# Patient Record
Sex: FEMALE | Race: WHITE | Employment: FULL TIME | URBAN - METROPOLITAN AREA
[De-identification: names, ages, dates, MRNs, and addresses within clinical notes are randomized per-mention and may not be internally consistent; named-entity substitution may affect disease eponyms.]

---

## 2017-11-16 PROBLEM — R00.2 PALPITATIONS: Status: ACTIVE | Noted: 2017-11-16

## 2018-06-22 ENCOUNTER — PREP FOR PROCEDURE (OUTPATIENT)
Dept: SURGERY | Age: 63
End: 2018-06-22

## 2018-06-22 DIAGNOSIS — R19.4 CHANGE IN BOWEL HABITS: ICD-10-CM

## 2018-06-22 DIAGNOSIS — K21.9 GASTROESOPHAGEAL REFLUX DISEASE, ESOPHAGITIS PRESENCE NOT SPECIFIED: Primary | ICD-10-CM

## 2018-06-22 RX ORDER — SODIUM CHLORIDE 9 MG/ML
INJECTION, SOLUTION INTRAVENOUS CONTINUOUS
Status: CANCELLED | OUTPATIENT
Start: 2018-06-27

## 2018-06-22 NOTE — PROGRESS NOTES
Spoke to Ms Peggy Glasgow. She is aware she has an ultrasound before her scopes. Reinforced NPO after midnight and to follow Dr Lukas Parsons instructions. EGD/Colonoscopy is at 1030.

## 2018-06-27 ENCOUNTER — HOSPITAL ENCOUNTER (OUTPATIENT)
Age: 63
Setting detail: OUTPATIENT SURGERY
Discharge: HOME | End: 2018-06-27
Attending: SURGERY | Admitting: SURGERY
Payer: COMMERCIAL

## 2018-06-27 VITALS
OXYGEN SATURATION: 100 % | RESPIRATION RATE: 14 BRPM | HEIGHT: 67 IN | TEMPERATURE: 98 F | HEART RATE: 79 BPM | WEIGHT: 156 LBS | SYSTOLIC BLOOD PRESSURE: 110 MMHG | BODY MASS INDEX: 24.48 KG/M2 | DIASTOLIC BLOOD PRESSURE: 69 MMHG

## 2018-06-27 PROCEDURE — 88305 TISSUE EXAM BY PATHOLOGIST: CPT

## 2018-06-27 PROCEDURE — 88342 IMHCHEM/IMCYTCHM 1ST ANTB: CPT

## 2018-06-27 PROCEDURE — 7100000011 HC PHASE II RECOVERY - ADDTL 15 MIN: Performed by: SURGERY

## 2018-06-27 PROCEDURE — 7100000010 HC PHASE II RECOVERY - FIRST 15 MIN: Performed by: SURGERY

## 2018-06-27 PROCEDURE — 3609010300 HC COLONOSCOPY W/BIOPSY SINGLE/MULTIPLE: Performed by: SURGERY

## 2018-06-27 PROCEDURE — 3609012400 HC EGD TRANSORAL BIOPSY SINGLE/MULTIPLE: Performed by: SURGERY

## 2018-06-27 PROCEDURE — 99153 MOD SED SAME PHYS/QHP EA: CPT | Performed by: SURGERY

## 2018-06-27 PROCEDURE — 2720000010 HC SURG SUPPLY STERILE: Performed by: SURGERY

## 2018-06-27 PROCEDURE — 6360000002 HC RX W HCPCS: Performed by: SURGERY

## 2018-06-27 PROCEDURE — 99152 MOD SED SAME PHYS/QHP 5/>YRS: CPT | Performed by: SURGERY

## 2018-06-27 PROCEDURE — 2580000003 HC RX 258: Performed by: SURGERY

## 2018-06-27 RX ORDER — 0.9 % SODIUM CHLORIDE 0.9 %
10 VIAL (ML) INJECTION EVERY 12 HOURS SCHEDULED
Status: DISCONTINUED | OUTPATIENT
Start: 2018-06-27 | End: 2018-06-27 | Stop reason: HOSPADM

## 2018-06-27 RX ORDER — 0.9 % SODIUM CHLORIDE 0.9 %
10 VIAL (ML) INJECTION PRN
Status: DISCONTINUED | OUTPATIENT
Start: 2018-06-27 | End: 2018-06-27 | Stop reason: HOSPADM

## 2018-06-27 RX ORDER — MIDAZOLAM HYDROCHLORIDE 1 MG/ML
INJECTION INTRAMUSCULAR; INTRAVENOUS PRN
Status: DISCONTINUED | OUTPATIENT
Start: 2018-06-27 | End: 2018-06-27 | Stop reason: HOSPADM

## 2018-06-27 RX ORDER — MEPERIDINE HYDROCHLORIDE 50 MG/ML
INJECTION INTRAMUSCULAR; INTRAVENOUS; SUBCUTANEOUS PRN
Status: DISCONTINUED | OUTPATIENT
Start: 2018-06-27 | End: 2018-06-27 | Stop reason: HOSPADM

## 2018-06-27 RX ORDER — SODIUM CHLORIDE 9 MG/ML
INJECTION, SOLUTION INTRAVENOUS CONTINUOUS
Status: DISCONTINUED | OUTPATIENT
Start: 2018-06-27 | End: 2018-06-27 | Stop reason: HOSPADM

## 2018-06-27 RX ORDER — PREDNISONE 1 MG/1
5 TABLET ORAL DAILY
Qty: 30 TABLET | Refills: 2 | Status: SHIPPED | OUTPATIENT
Start: 2018-06-27 | End: 2018-11-13

## 2018-06-27 RX ADMIN — SODIUM CHLORIDE: 9 INJECTION, SOLUTION INTRAVENOUS at 10:16

## 2018-06-27 ASSESSMENT — PAIN SCALES - GENERAL
PAINLEVEL_OUTOF10: 0
PAINLEVEL_OUTOF10: 0

## 2018-06-27 ASSESSMENT — PAIN - FUNCTIONAL ASSESSMENT: PAIN_FUNCTIONAL_ASSESSMENT: 0-10

## 2018-06-27 NOTE — PRE SEDATION
Sedation Pre-Procedure Note    Patient Name: Emily Hollins   YOB: 1955  Room/Bed: ENDO POOL ROOM/NONE  Medical Record Number: [id number]  Date: 6/27/2018   Time: 12:47 PM       Indication:  Upper endo and colonoscopy    Consent: I have discussed with the patient and/or the patient representative the indication, alternatives, and the possible risks and/or complications of the planned procedure and the anesthesia methods. The patient and/or patient representative appear to understand and agree to proceed. Vital Signs:   Vitals:    06/27/18 1214   BP:    Pulse:    Resp: 12   Temp:    SpO2:        Past Medical History:   has a past medical history of Depression; GERD (gastroesophageal reflux disease); Irregular heart beat; and Restless leg syndrome. Past Surgical History:   has a past surgical history that includes Upper gastrointestinal endoscopy (12/22/11); cardiovascular stress test (3/8/16); and Colonoscopy. Medications:   Scheduled Meds:    sodium chloride (PF)  10 mL Intravenous 2 times per day     Continuous Infusions:    sodium chloride 42 mL/hr at 06/27/18 1016     PRN Meds: sodium chloride (PF), meperidine, midazolam  Home Meds:   Prior to Admission medications    Medication Sig Start Date End Date Taking? Authorizing Provider   gabapentin (NEURONTIN) 300 MG capsule Take 300 mg by mouth nightly   Yes Historical Provider, MD   ibuprofen (ADVIL;MOTRIN) 800 MG tablet Take 1 tablet by mouth every 8 hours as needed for Pain 10/3/16  Yes Odell Barbosa MD   buPROPion (WELLBUTRIN XL) 150 MG XL tablet Take 150 mg by mouth 2 times daily. Yes Historical Provider, MD   duloxetine (CYMBALTA) 60 MG capsule Take 60 mg by mouth daily.      Yes Historical Provider, MD     Coumadin Use Last 7 Days:  no  Antiplatelet drug therapy use last 7 days: no  Other anticoagulant use last 7 days: no  Additional Medication Information:  none      Pre-Sedation Documentation and Exam:   I have personally

## 2018-06-28 NOTE — PROCEDURES
[address]                   [address] [facility],  [facility]                                  PROCEDURE NOTE    PATIENT NAME: Shaun Murphy                   :        1955  MED REC NO:   [id number]                            ROOM:  ACCOUNT NO:   [de-identified]                           ADMIT DATE: 2018  PROVIDER:     Jonah Yusuf MD    DATE OF PROCEDURE:  2018    PREOPERATIVE DIAGNOSIS:  Change in bowel habits. POSTOPERATIVE DIAGNOSIS:  Colitis active left side and mild gastritis. PROCEDURE:  Consisted of an upper endoscopy and antral biopsies and  colonoscopy with cecal biopsy. DESCRIPTION OF PROCEDURE:  The procedure was carried out under awake  sedation. The patient was left in supine position. Oropharyngeal area was  anesthetized with Cetacaine spray. The endoscope was then introduced and  progressed without difficulty through the three portions of the esophagus. GE junction was patent and competent. Within the stomach, there were mild  inflammatory changes in the antral region. Pylorus was widely patent. First, second, and third portions of the duodenum did not exhibit any  mucosal pathology. The endoscope was pulled back. Biopsies were taken  with good hemostasis and retroflexed, revealing small hiatal hernia. Endoscopy was terminated. The endoscope was pulled back through the  oropharyngeal area revealing good functioning vocal cords. The patient  tolerated the procedure well and attention was directed to the colonoscopy. The patient was again in the supine position. Rectal examination was  performed which revealed adequate anal tone. No masses within the rectal  ampulla. Endoscope was then introduced and there was evidence of bleeding,  superficial ulcerations, mild inflammatory changes throughout the rectal  ampulla as well as the rectosigmoid and part of the descending colon.   The  remaining portion of the transverse colon did not exhibit significant  mucosal changes. There were mild inflammatory changes in the sacral area  and biopsies were taken. Ileocecal valve was well visualized. No other  mucosal pathology was noted and with the patient in supine position, the  endoscope was progressively withdrawn with similar findings. The patient  was informed of the findings.  was informed of the findings. Appropriate recommendations were given. Therapy was initiated and the  patient will be followed up on outpatient basis in my office for further  management as needed.         Rodri Martin MD    D: 06/27/2018 14:50:45       T: 06/27/2018 17:41:10     /[email or web address]  Job#: [id number]     Doc#: [id number]    CC:

## 2018-07-06 ENCOUNTER — HOSPITAL ENCOUNTER (OUTPATIENT)
Dept: ULTRASOUND IMAGING | Age: 63
Discharge: HOME | End: 2018-07-08
Payer: COMMERCIAL

## 2018-07-06 DIAGNOSIS — K21.9 GASTROESOPHAGEAL REFLUX DISEASE, ESOPHAGITIS PRESENCE NOT SPECIFIED: ICD-10-CM

## 2018-07-06 PROCEDURE — 76705 ECHO EXAM OF ABDOMEN: CPT

## 2018-08-17 ENCOUNTER — APPOINTMENT (OUTPATIENT)
Dept: GENERAL RADIOLOGY | Age: 63
End: 2018-08-17
Payer: COMMERCIAL

## 2018-08-17 ENCOUNTER — APPOINTMENT (OUTPATIENT)
Dept: CT IMAGING | Age: 63
End: 2018-08-17
Payer: COMMERCIAL

## 2018-08-17 ENCOUNTER — HOSPITAL ENCOUNTER (EMERGENCY)
Age: 63
Discharge: HOME | End: 2018-08-17
Attending: EMERGENCY MEDICINE
Payer: COMMERCIAL

## 2018-08-17 VITALS
HEART RATE: 81 BPM | SYSTOLIC BLOOD PRESSURE: 132 MMHG | HEIGHT: 67 IN | WEIGHT: 155 LBS | RESPIRATION RATE: 16 BRPM | TEMPERATURE: 98.4 F | BODY MASS INDEX: 24.33 KG/M2 | OXYGEN SATURATION: 98 % | DIASTOLIC BLOOD PRESSURE: 78 MMHG

## 2018-08-17 DIAGNOSIS — R07.9 CHEST PAIN, UNSPECIFIED TYPE: Primary | ICD-10-CM

## 2018-08-17 DIAGNOSIS — R55 NEAR SYNCOPE: ICD-10-CM

## 2018-08-17 LAB
ANION GAP SERPL CALCULATED.3IONS-SCNC: 9 MMOL/L (ref 7–16)
BASOPHILS ABSOLUTE: 0.05 E9/L (ref 0–0.2)
BASOPHILS RELATIVE PERCENT: 0.9 % (ref 0–2)
BUN BLDV-MCNC: 8 MG/DL (ref 8–23)
CALCIUM SERPL-MCNC: 9.3 MG/DL (ref 8.6–10.2)
CHLORIDE BLD-SCNC: 97 MMOL/L (ref 98–107)
CO2: 28 MMOL/L (ref 22–29)
CREAT SERPL-MCNC: 0.9 MG/DL (ref 0.5–1)
D DIMER: 2418 NG/ML DDU
EKG ATRIAL RATE: 82 BPM
EKG P AXIS: 80 DEGREES
EKG P-R INTERVAL: 148 MS
EKG Q-T INTERVAL: 346 MS
EKG QRS DURATION: 82 MS
EKG QTC CALCULATION (BAZETT): 404 MS
EKG R AXIS: 55 DEGREES
EKG T AXIS: 59 DEGREES
EKG VENTRICULAR RATE: 82 BPM
EOSINOPHILS ABSOLUTE: 0.09 E9/L (ref 0.05–0.5)
EOSINOPHILS RELATIVE PERCENT: 1.6 % (ref 0–6)
GFR AFRICAN AMERICAN: >60
GFR NON-AFRICAN AMERICAN: >60 ML/MIN/1.73
GLUCOSE BLD-MCNC: 94 MG/DL (ref 74–109)
HCT VFR BLD CALC: 36.7 % (ref 34–48)
HEMOGLOBIN: 12.2 G/DL (ref 11.5–15.5)
IMMATURE GRANULOCYTES #: 0.02 E9/L
IMMATURE GRANULOCYTES %: 0.4 % (ref 0–5)
LYMPHOCYTES ABSOLUTE: 1.8 E9/L (ref 1.5–4)
LYMPHOCYTES RELATIVE PERCENT: 32 % (ref 20–42)
MCH RBC QN AUTO: 29.5 PG (ref 26–35)
MCHC RBC AUTO-ENTMCNC: 33.2 % (ref 32–34.5)
MCV RBC AUTO: 88.9 FL (ref 80–99.9)
MONOCYTES ABSOLUTE: 0.59 E9/L (ref 0.1–0.95)
MONOCYTES RELATIVE PERCENT: 10.5 % (ref 2–12)
NEUTROPHILS ABSOLUTE: 3.08 E9/L (ref 1.8–7.3)
NEUTROPHILS RELATIVE PERCENT: 54.6 % (ref 43–80)
PDW BLD-RTO: 12.9 FL (ref 11.5–15)
PLATELET # BLD: 197 E9/L (ref 130–450)
PMV BLD AUTO: 9.3 FL (ref 7–12)
POTASSIUM SERPL-SCNC: 4.2 MMOL/L (ref 3.5–5)
PRO-BNP: 208 PG/ML (ref 0–125)
RBC # BLD: 4.13 E12/L (ref 3.5–5.5)
SODIUM BLD-SCNC: 134 MMOL/L (ref 132–146)
TROPONIN: <0.01 NG/ML (ref 0–0.03)
WBC # BLD: 5.6 E9/L (ref 4.5–11.5)

## 2018-08-17 PROCEDURE — 99285 EMERGENCY DEPT VISIT HI MDM: CPT

## 2018-08-17 PROCEDURE — 36415 COLL VENOUS BLD VENIPUNCTURE: CPT

## 2018-08-17 PROCEDURE — 84484 ASSAY OF TROPONIN QUANT: CPT

## 2018-08-17 PROCEDURE — 6360000004 HC RX CONTRAST MEDICATION: Performed by: RADIOLOGY

## 2018-08-17 PROCEDURE — 85025 COMPLETE CBC W/AUTO DIFF WBC: CPT

## 2018-08-17 PROCEDURE — 71275 CT ANGIOGRAPHY CHEST: CPT

## 2018-08-17 PROCEDURE — 80048 BASIC METABOLIC PNL TOTAL CA: CPT

## 2018-08-17 PROCEDURE — 85378 FIBRIN DEGRADE SEMIQUANT: CPT

## 2018-08-17 PROCEDURE — 71045 X-RAY EXAM CHEST 1 VIEW: CPT

## 2018-08-17 PROCEDURE — 83880 ASSAY OF NATRIURETIC PEPTIDE: CPT

## 2018-08-17 RX ADMIN — IOPAMIDOL 60 ML: 755 INJECTION, SOLUTION INTRAVENOUS at 14:58

## 2018-08-17 ASSESSMENT — ENCOUNTER SYMPTOMS
COUGH: 0
VOMITING: 0
RHINORRHEA: 0
COLOR CHANGE: 0
CONSTIPATION: 0
ABDOMINAL PAIN: 0
BLOOD IN STOOL: 0
SORE THROAT: 0
BACK PAIN: 0
SHORTNESS OF BREATH: 1
DIARRHEA: 0
NAUSEA: 0

## 2018-08-17 NOTE — ED PROVIDER NOTES
CO2 28 22 - 29 mmol/L    Anion Gap 9 7 - 16 mmol/L    Glucose 94 74 - 109 mg/dL    BUN 8 8 - 23 mg/dL    CREATININE 0.9 0.5 - 1.0 mg/dL    GFR Non-African American >60 >=60 mL/min/1.73    GFR African American >60     Calcium 9.3 8.6 - 10.2 mg/dL   Troponin   Result Value Ref Range    Troponin <0.01 0.00 - 0.03 ng/mL   Brain Natriuretic Peptide   Result Value Ref Range    Pro- (H) 0 - 125 pg/mL   D-Dimer, Quantitative   Result Value Ref Range    D-Dimer, Quant 2418 ng/mL DDU   EKG 12 Lead   Result Value Ref Range    Ventricular Rate 82 BPM    Atrial Rate 82 BPM    P-R Interval 148 ms    QRS Duration 82 ms    Q-T Interval 346 ms    QTc Calculation (Bazett) 404 ms    P Axis 80 degrees    R Axis 55 degrees    T Axis 59 degrees       Radiology:  CTA PULMONARY W CONTRAST   Final Result   1. No evidence to suggest pulmonary arterial emboli. 2. No acute cardiopulmonary pathology identified. 3. Stable noncalcified pulmonary nodules bilaterally as detailed above   with largest one measuring 5 x 5 mm in the left lower lobe. XR CHEST PORTABLE   Final Result   No evidence of acute pulmonary disease. EKG:  This EKG is signed and interpreted by ED Physician. Time:  1135   Rate: 82  Rhythm: Sinus. Interpretation: Normal axis; no ST or T-wave changes. Comparison: stable as compared to patient's most recent EKG, 11/16/17    ------------------------- NURSING NOTES AND VITALS REVIEWED ---------------------------  Date / Time Roomed:  8/17/2018 11:30 AM  ED Bed Assignment:  13/13    The nursing notes within the ED encounter and vital signs as below have been reviewed.    Pulse 97   Temp 98.4 °F (36.9 °C)   Resp 18   Ht 5' 7\" (1.702 m)   Wt 155 lb (70.3 kg)   SpO2 98%   BMI 24.28 kg/m²   Oxygen Saturation Interpretation: Normal      ------------------------------------------ PROGRESS NOTES ------------------------------------------  ED COURSE MEDICATIONS:                Medications   iopamidol (ISOVUE-370) 76 % injection 60 mL (60 mLs Intravenous Given 8/17/18 8158)       I have spoken with the patient and spouse and discussed todays results, in addition to providing specific details for the plan of care and counseling regarding the diagnosis and prognosis. Their questions are answered at this time and they are agreeable with the plan. I discussed at length with them reasons for immediate return here for re evaluation. They will followup with primary care by calling their office tomorrow. --------------------------------- ADDITIONAL PROVIDER NOTES ---------------------------------  At this time the patient is without objective evidence of an acute process requiring hospitalization or inpatient management. They have remained hemodynamically stable throughout their entire ED visit and are stable for discharge with outpatient follow-up. The plan has been discussed in detail and they are aware of the specific conditions for emergent return, as well as the importance of follow-up. New Prescriptions    No medications on file       Diagnosis:  1. Chest pain, unspecified type    2. Near syncope        Disposition:  Patient's disposition: Discharge to home  Patient's condition is stable.            Diamond Alaniz DO  Resident  08/17/18 1949

## 2018-08-17 NOTE — ED NOTES
Patient feels ready to go home. Denies chest pain now. States she feels stuffy, congested, thinks the dizziness is coming from that. Denies the room spinning. Gait steady with ambulation.       Shantel Gimenez RN  08/17/18 6577

## 2018-11-13 ENCOUNTER — OFFICE VISIT (OUTPATIENT)
Dept: CARDIOLOGY CLINIC | Age: 63
End: 2018-11-13
Payer: COMMERCIAL

## 2018-11-13 VITALS
HEIGHT: 67 IN | BODY MASS INDEX: 25.62 KG/M2 | SYSTOLIC BLOOD PRESSURE: 132 MMHG | WEIGHT: 163.2 LBS | DIASTOLIC BLOOD PRESSURE: 82 MMHG | HEART RATE: 75 BPM | RESPIRATION RATE: 16 BRPM

## 2018-11-13 DIAGNOSIS — R07.89 ATYPICAL CHEST PAIN: ICD-10-CM

## 2018-11-13 DIAGNOSIS — I47.1 SUPRAVENTRICULAR TACHYCARDIA (HCC): Primary | ICD-10-CM

## 2018-11-13 PROCEDURE — 93000 ELECTROCARDIOGRAM COMPLETE: CPT | Performed by: INTERNAL MEDICINE

## 2018-11-13 PROCEDURE — 99213 OFFICE O/P EST LOW 20 MIN: CPT | Performed by: INTERNAL MEDICINE

## 2018-11-13 NOTE — PATIENT INSTRUCTIONS
the  may tell you to chew 1 adult-strength or 2 to 4 low-dose aspirin. Wait for an ambulance. Do not try to drive yourself.   Watch closely for changes in your health, and be sure to contact your doctor if you have any problems. Where can you learn more? Go to [email or web address] and sign in to your [employer] account. Enter N332 in the [employer] box to learn more about \"A Healthy Heart: Care Instructions. \"     If you do not have an account, please click on the \"Sign Up Now\" link. Current as of: December 6, 2017  Content Version: 11.8  © 4519-1754 Healthwise, Incorporated. Care instructions adapted under license by [facility] ([facility]). If you have questions about a medical condition or this instruction, always ask your healthcare professional. [address] any warranty or liability for your use of this information.

## 2018-11-13 NOTE — PROGRESS NOTES
notable for a regular rate and rhythm with no palpable thrill. No gallop rhythm or cardiac murmur are identified. A benign abdominal examination is present with no masses or organomegaly. Intact pulses are present throughout all extremities and no peripheral edema is present. No focal neurologic deficits are present. Laboratory Tests:  Lab Results   Component Value Date    CREATININE 0.9 08/17/2018    BUN 8 08/17/2018     08/17/2018    K 4.2 08/17/2018    CL 97 (L) 08/17/2018    CO2 28 08/17/2018     Lab Results   Component Value Date    BNP 18 02/26/2013     Lab Results   Component Value Date    WBC 5.6 08/17/2018    RBC 4.13 08/17/2018    HGB 12.2 08/17/2018    HCT 36.7 08/17/2018    MCV 88.9 08/17/2018    MCH 29.5 08/17/2018    MCHC 33.2 08/17/2018    RDW 12.9 08/17/2018     08/17/2018    MPV 9.3 08/17/2018     No results for input(s): ALKPHOS, ALT, AST, PROT, BILITOT, BILIDIR, LABALBU in the last 72 hours. Lab Results   Component Value Date    MG 2.1 02/26/2013     Lab Results   Component Value Date    PROTIME 10.4 11/27/2014    INR 1.0 11/27/2014     Lab Results   Component Value Date    TSH 3.782 02/26/2013     No components found for: CHLPL  No results found for: TRIG  No results found for: HDL  No results found for: [address]    Cardiac Tests:  ECG: A resting electrocardiogram demonstrates evidence of sinus rhythm and is otherwise unremarkable  Chest X-ray: Chest x-ray of August, 2018 at the time of emergency room evaluation demonstrated no evidence of cardiomegaly or infiltrate      ASSESSMENT / PLAN:  On a clinical basis, the patient appears stable from a cardiovascular standpoint with no significant interim cardiovascular symptoms in the face of her paroxysmal supraventricular tachycardia.  I presently have not altered her existing medical regimen and have encouraged has she had noted the needs of appropriate lifestyle modification to achieve weight reduction which will benefit diastolic cardiac performance and assist in management of blood pressure. Presently have recommended intermittent monitoring of her blood pressure without need of present therapy. Continued appropriate risk factor modification blood pressure and serum lipids will be necessary to reduce risk of future atherosclerotic development. I presently plan her clinical reassessment in one year and would happily reassess her in the interim should additional cardiovascular difficulties or concerns arise. The patient's current medication list, allergies, problem list and results of all previously ordered testing were reviewed at today's visit. Follow-up office visit in 1 year      Note: This report was completed using computerized voice recognition software. Every effort has been made to ensure accuracy, however; and invert and computerized transcription errors may be present. Chyna Lewis.  Ross Messinas, [address] [place] [address] Cardiology    An electronic copy of this follow-up progress note was forwarded to Dr. Linh Hills

## 2018-12-28 ENCOUNTER — APPOINTMENT (OUTPATIENT)
Dept: GENERAL RADIOLOGY | Age: 63
End: 2018-12-28
Payer: COMMERCIAL

## 2018-12-28 ENCOUNTER — HOSPITAL ENCOUNTER (EMERGENCY)
Age: 63
Discharge: HOME | End: 2018-12-28
Payer: COMMERCIAL

## 2018-12-28 VITALS
HEART RATE: 82 BPM | BODY MASS INDEX: 25.11 KG/M2 | HEIGHT: 67 IN | WEIGHT: 160 LBS | RESPIRATION RATE: 14 BRPM | SYSTOLIC BLOOD PRESSURE: 120 MMHG | OXYGEN SATURATION: 98 % | DIASTOLIC BLOOD PRESSURE: 76 MMHG | TEMPERATURE: 98 F

## 2018-12-28 DIAGNOSIS — M79.641 RIGHT HAND PAIN: Primary | ICD-10-CM

## 2018-12-28 PROCEDURE — 73130 X-RAY EXAM OF HAND: CPT

## 2018-12-28 PROCEDURE — 99283 EMERGENCY DEPT VISIT LOW MDM: CPT

## 2018-12-28 RX ORDER — PREDNISONE 10 MG/1
40 TABLET ORAL DAILY
Qty: 20 TABLET | Refills: 0 | Status: SHIPPED | OUTPATIENT
Start: 2018-12-28 | End: 2019-01-02

## 2018-12-28 ASSESSMENT — PAIN DESCRIPTION - ORIENTATION: ORIENTATION: RIGHT

## 2018-12-28 ASSESSMENT — PAIN DESCRIPTION - LOCATION: LOCATION: FINGER (COMMENT WHICH ONE)

## 2018-12-28 ASSESSMENT — PAIN DESCRIPTION - PAIN TYPE: TYPE: ACUTE PAIN

## 2018-12-28 ASSESSMENT — PAIN SCALES - GENERAL: PAINLEVEL_OUTOF10: 2

## 2018-12-28 ASSESSMENT — PAIN DESCRIPTION - DESCRIPTORS: DESCRIPTORS: ACHING

## 2019-03-22 ENCOUNTER — OFFICE VISIT (OUTPATIENT)
Dept: ORTHOPEDIC SURGERY | Age: 64
End: 2019-03-22
Payer: COMMERCIAL

## 2019-03-22 VITALS
WEIGHT: 165 LBS | SYSTOLIC BLOOD PRESSURE: 128 MMHG | HEIGHT: 67 IN | TEMPERATURE: 97.1 F | HEART RATE: 88 BPM | DIASTOLIC BLOOD PRESSURE: 77 MMHG | BODY MASS INDEX: 25.9 KG/M2

## 2019-03-22 DIAGNOSIS — M25.561 RIGHT KNEE PAIN, UNSPECIFIED CHRONICITY: Primary | ICD-10-CM

## 2019-03-22 PROCEDURE — 20610 DRAIN/INJ JOINT/BURSA W/O US: CPT | Performed by: ORTHOPAEDIC SURGERY

## 2019-03-22 PROCEDURE — 99213 OFFICE O/P EST LOW 20 MIN: CPT | Performed by: ORTHOPAEDIC SURGERY

## 2019-03-22 RX ORDER — LIDOCAINE HYDROCHLORIDE 10 MG/ML
4 INJECTION, SOLUTION INFILTRATION; PERINEURAL ONCE
Status: COMPLETED | OUTPATIENT
Start: 2019-03-22 | End: 2019-03-22

## 2019-03-22 RX ORDER — TRIAMCINOLONE ACETONIDE 40 MG/ML
40 INJECTION, SUSPENSION INTRA-ARTICULAR; INTRAMUSCULAR ONCE
Status: COMPLETED | OUTPATIENT
Start: 2019-03-22 | End: 2019-03-22

## 2019-03-22 RX ADMIN — LIDOCAINE HYDROCHLORIDE 4 ML: 10 INJECTION, SOLUTION INFILTRATION; PERINEURAL at 10:56

## 2019-03-22 RX ADMIN — TRIAMCINOLONE ACETONIDE 40 MG: 40 INJECTION, SUSPENSION INTRA-ARTICULAR; INTRAMUSCULAR at 10:56

## 2019-03-29 ENCOUNTER — HOSPITAL ENCOUNTER (OUTPATIENT)
Dept: PHYSICAL THERAPY | Age: 64
Setting detail: THERAPIES SERIES
Discharge: HOME | End: 2019-03-29
Payer: COMMERCIAL

## 2019-03-29 PROCEDURE — 97161 PT EVAL LOW COMPLEX 20 MIN: CPT | Performed by: PHYSICAL THERAPIST

## 2019-04-02 ENCOUNTER — HOSPITAL ENCOUNTER (OUTPATIENT)
Dept: PHYSICAL THERAPY | Age: 64
Setting detail: THERAPIES SERIES
Discharge: HOME | End: 2019-04-02
Payer: COMMERCIAL

## 2019-04-02 PROCEDURE — 97110 THERAPEUTIC EXERCISES: CPT

## 2019-04-04 ENCOUNTER — HOSPITAL ENCOUNTER (OUTPATIENT)
Dept: PHYSICAL THERAPY | Age: 64
Setting detail: THERAPIES SERIES
Discharge: HOME | End: 2019-04-04
Payer: COMMERCIAL

## 2019-04-04 PROCEDURE — 97110 THERAPEUTIC EXERCISES: CPT

## 2019-04-04 NOTE — PROGRESS NOTES
[facility]  Phone: [phone] Fax: [phone]       Physical Therapy Daily Treatment Note  Date:  2019    Patient Name:  Josefina Mcmillan    :  1955  MRN: [id number]    Restrictions/Precautions:    Diagnosis:  Right Knee Pain  Treatment Diagnosis:    Insurance/Certification information: [employer]   Referring Physician:  Dr. Trevor Slater of care signed (Y/N):    Visit# / total visits:  3/4 weeks  Pain level:  0/10  Time In: 7:30       Time Out: 8:00        Subjective:  Pt states she had some mild soreness in right lateral knee after initial exercise session. Exercises:  Exercise/Equipment Resistance/Repetitions Other comments   Bike   5 min nt   Hamstring stretch 10\" 5 x @ step   Knee flexion stretch  10\" 5 x  @ step   Calf stretch nt    Quad sets 5\" 10 x    SLR 10 x    SAQ 10 x 2    LAQ 10 x    Side lying hip abd 10 x             Sit to stand from 24\" box 10 x                                                              Other Therapeutic Activities:      Home Exercise Program:      Manual Treatments:      Modalities: Offered- pt declined; no pain    Timed Code Treatment Minutes:  30    Total Treatment Minutes:  30    Treatment/Activity Tolerance:  [x] Patient tolerated treatment well [] Patient limited by fatigue  [] Patient limited by pain  [] Patient limited by other medical complications  [x] Other: Good tolerance for exercise today. Pt notes mild muscle soreness however no knee joint pain. Prognosis: [x] Good [] Fair  [] Poor    Patient Requires Follow-up: [x] Yes  [] No    Plan:   [x] Continue per plan of care [] Alter current plan (see comments)  [] Plan of care initiated [] Hold pending MD visit [] Discharge  Plan for Next Session:         Electronically signed by:   Ruslan Monroy

## 2019-04-10 ENCOUNTER — HOSPITAL ENCOUNTER (OUTPATIENT)
Dept: PHYSICAL THERAPY | Age: 64
Setting detail: THERAPIES SERIES
Discharge: HOME | End: 2019-04-10
Payer: COMMERCIAL

## 2019-04-15 ENCOUNTER — HOSPITAL ENCOUNTER (OUTPATIENT)
Dept: PHYSICAL THERAPY | Age: 64
Setting detail: THERAPIES SERIES
Discharge: HOME | End: 2019-04-15
Payer: COMMERCIAL

## 2019-04-15 PROCEDURE — 97110 THERAPEUTIC EXERCISES: CPT

## 2019-04-15 NOTE — PROGRESS NOTES
[facility]  Phone: [phone] Fax: [phone]       Physical Therapy Daily Treatment Note  Date:  4/15/2019    Patient Name:  Deb Larsen    :  1955  MRN: [id number]    Restrictions/Precautions:    Diagnosis:  Right Knee Pain  Treatment Diagnosis:    Insurance/Certification information: [employer]   Referring Physician:  Dr. Chito Lakhani of care signed (Y/N):    Visit# / total visits:  4/4 weeks  Pain level:  0/10  Time In: 7:27       Time Out: 7:50        Subjective:  Pt states she had pain behind right knee last week which resolved after she was resting d/t being ill. Exercises:  Exercise/Equipment Resistance/Repetitions Other comments   Bike   5 min    Hamstring stretch 10\" 5 x @ step   Knee flexion stretch  10\" 5 x  @ step   Calf stretch nt    Quad sets 5\" 10 x    SLR 10 x    SAQ 10 x 2    LAQ 10 x 2    Side lying hip abd 10 x nt            Sit to stand from 24\" box 10 x                                                              Other Therapeutic Activities:      Home Exercise Program:      Manual Treatments:      Modalities: Offered- pt declined; no pain    Timed Code Treatment Minutes:  20    Total Treatment Minutes:  23    Treatment/Activity Tolerance:  [x] Patient tolerated treatment well [] Patient limited by fatigue  [] Patient limited by pain  [] Patient limited by other medical complications  [x] Other: Good tolerance for exercise today. No right knee pain noted. Prognosis: [x] Good [] Fair  [] Poor    Patient Requires Follow-up: [x] Yes  [] No    Plan:   [x] Continue per plan of care [] Alter current plan (see comments)  [] Plan of care initiated [] Hold pending MD visit [] Discharge  Plan for Next Session:         Electronically signed by:   Ruslan Monroy

## 2019-04-17 ENCOUNTER — HOSPITAL ENCOUNTER (OUTPATIENT)
Dept: PHYSICAL THERAPY | Age: 64
Setting detail: THERAPIES SERIES
Discharge: HOME | End: 2019-04-17
Payer: COMMERCIAL

## 2019-04-17 PROCEDURE — 97110 THERAPEUTIC EXERCISES: CPT

## 2019-04-24 ENCOUNTER — HOSPITAL ENCOUNTER (OUTPATIENT)
Dept: PHYSICAL THERAPY | Age: 64
Setting detail: THERAPIES SERIES
Discharge: HOME | End: 2019-04-24
Payer: COMMERCIAL

## 2019-04-24 PROCEDURE — 97110 THERAPEUTIC EXERCISES: CPT

## 2019-04-24 NOTE — PROGRESS NOTES
[facility]  Phone: [phone] Fax: [phone]       Physical Therapy Daily Treatment Note  Date:  2019    Patient Name:  Yves Galvin    :  1955  MRN: [id number]    Restrictions/Precautions:    Diagnosis:  Right Knee Pain  Treatment Diagnosis:    Insurance/Certification information: [employer]   Referring Physician:  Dr. Fabian Leon of care signed (Y/N):    Visit# / total visits:  6/4 weeks  Pain level:  0/10  Time In: 7:25       Time Out: 8:00        Subjective:  Pt has no c/o this morning. States she is ascending stairs without increased pain. Still using UE support to descend. Exercises:  Exercise/Equipment Resistance/Repetitions Other comments   Bike   10 min    Hamstring stretch 10\" 5 x @ step   Knee flexion stretch  10\" 5 x  @ step   Calf stretch nt    Quad sets 5\" 10 x    SLR 10 x 2    SAQ 15 x 2    LAQ 10 x 2    Side lying hip abd 10 x    Heel slides      15 x    Sit to stand from 24\" box 13 x           Step ups fwd 6\" 3 x limited by lat. knee pain           TKE w/sportcord   nt                                  Other Therapeutic Activities:      Home Exercise Program:      Manual Treatments:      Modalities: Offered- pt declined; no pain    Timed Code Treatment Minutes:  35    Total Treatment Minutes:  35    Treatment/Activity Tolerance:  [x] Patient tolerated treatment well [] Patient limited by fatigue  [] Patient limited by pain  [] Patient limited by other medical complications  [x] Other:  Good tolerance for progression of open chain exercises. Pain with attempt at step ups. Will hold this activity at this time. Prognosis: [x] Good [] Fair  [] Poor    Patient Requires Follow-up: [x] Yes  [] No    Plan:   [x] Continue per plan of care [] Alter current plan (see comments)  [] Plan of care initiated [] Hold pending MD visit [] Discharge  Plan for Next Session: Continue progression of strengthening exercises as tolerated.         Electronically signed by:   Ruslan Monroy

## 2019-04-27 ENCOUNTER — HOSPITAL ENCOUNTER (OUTPATIENT)
Dept: PHYSICAL THERAPY | Age: 64
Setting detail: THERAPIES SERIES
Discharge: HOME | End: 2019-04-27
Payer: COMMERCIAL

## 2019-04-27 PROCEDURE — 97110 THERAPEUTIC EXERCISES: CPT

## 2019-04-27 NOTE — PROGRESS NOTES
[facility]  Phone: [phone] Fax: [phone]       Physical Therapy Daily Treatment Note  Date:  2019    Patient Name:  Jacob Stoddard    :  1955  MRN: [id number]    Restrictions/Precautions:    Diagnosis:  Right Knee Pain  Treatment Diagnosis:    Insurance/Certification information: [employer]   Referring Physician:  Dr. Bereket Roberts of care signed (Y/N):    Visit# / total visits:  7/4 weeks  Pain level:  0/10  Time In: 8:00       Time Out: 8:40        Subjective:  Pt states her right knee was sore yesterday however feels better today. Reports when she has pain it is less intensity than prior to therapy. Exercises:  Exercise/Equipment Resistance/Repetitions Other comments   Bike   10 min    Hamstring stretch 10\" 5 x @ step   Knee flexion stretch  10\" 5 x  @ step   Calf stretch nt    Quad sets 5\" 10 x nt   SLR 10 x, 5 x    SAQ 2# 15 x, 10 x    LAQ 2# 10 x 2    Side lying hip abd 10 x    Heel slides      15 x    Sit to stand from 24\" box 2 x 10           Step ups fwd 6\" 3 x limited by lat. knee pain nt          TKE w/sportcord   nt                                  Other Therapeutic Activities:      Home Exercise Program:      Manual Treatments:      Modalities:     Timed Code Treatment Minutes:  35    Total Treatment Minutes:  40    Treatment/Activity Tolerance:  [x] Patient tolerated treatment well [] Patient limited by fatigue  [] Patient limited by pain  [] Patient limited by other medical complications  [] Other:      Prognosis: [x] Good [] Fair  [] Poor    Patient Requires Follow-up: [x] Yes  [] No    Plan:   [x] Continue per plan of care [] Alter current plan (see comments)  [] Plan of care initiated [] Hold pending MD visit [] Discharge  Plan for Next Session: Continue progression of strengthening exercises as tolerated. Electronically signed by:   Ruslan Monroy

## 2019-04-29 ENCOUNTER — HOSPITAL ENCOUNTER (OUTPATIENT)
Dept: PHYSICAL THERAPY | Age: 64
Setting detail: THERAPIES SERIES
Discharge: HOME | End: 2019-04-29
Payer: COMMERCIAL

## 2019-04-29 PROCEDURE — 97110 THERAPEUTIC EXERCISES: CPT

## 2019-04-29 NOTE — PROGRESS NOTES
[facility]  Phone: [phone] Fax: [phone]       Physical Therapy Daily Treatment Note  Date:  2019    Patient Name:  Magaly Burton    :  1955  MRN: [id number]    Restrictions/Precautions:    Diagnosis:  Right Knee Pain  Treatment Diagnosis:    Insurance/Certification information: [employer]   Referring Physician:  Dr. Kee Smoke of care signed (Y/N):    Visit# / total visits:  8/4 weeks  Pain level:  0/10  Time In: 7:25       Time Out: 8:00        Subjective:  Pt has no new c/o this morning. Exercises:  Exercise/Equipment Resistance/Repetitions Other comments   Bike   10 min    Hamstring stretch 10\" 5 x @ step   Knee flexion stretch  10\" 5 x  @ step   Calf stretch nt    Quad sets 5\" 10 x nt   SLR 2 x 10    SAQ 2# 15 x 2    LAQ 2# 10 x 2    Side lying hip abd 10 x    Heel slides      15 x nt   Sit to stand from 24\" box 2 x 10           Step ups fwd 6\" 3 x limited by lat. knee pain nt          TKE w/sportcord   GSC 2 x 10                                  Other Therapeutic Activities:      Home Exercise Program:      Manual Treatments:      Modalities:     Timed Code Treatment Minutes:  30    Total Treatment Minutes:  35    Treatment/Activity Tolerance:  [x] Patient tolerated treatment well [] Patient limited by fatigue  [] Patient limited by pain  [] Patient limited by other medical complications  [] Other:      Prognosis: [x] Good [] Fair  [] Poor    Patient Requires Follow-up: [x] Yes  [] No    Plan:   [x] Continue per plan of care [] Alter current plan (see comments)  [] Plan of care initiated [] Hold pending MD visit [] Discharge  Plan for Next Session: Continue progression of strengthening exercises as tolerated. Electronically signed by:   Ruslan Monroy

## 2019-05-01 ENCOUNTER — HOSPITAL ENCOUNTER (OUTPATIENT)
Dept: PHYSICAL THERAPY | Age: 64
Setting detail: THERAPIES SERIES
Discharge: HOME | End: 2019-05-01
Payer: COMMERCIAL

## 2019-05-01 PROCEDURE — 97110 THERAPEUTIC EXERCISES: CPT

## 2019-05-03 ENCOUNTER — OFFICE VISIT (OUTPATIENT)
Dept: ORTHOPEDIC SURGERY | Age: 64
End: 2019-05-03
Payer: COMMERCIAL

## 2019-05-03 VITALS
DIASTOLIC BLOOD PRESSURE: 87 MMHG | HEART RATE: 91 BPM | SYSTOLIC BLOOD PRESSURE: 135 MMHG | BODY MASS INDEX: 25.11 KG/M2 | WEIGHT: 160 LBS | HEIGHT: 67 IN

## 2019-05-03 DIAGNOSIS — M25.561 RIGHT KNEE PAIN, UNSPECIFIED CHRONICITY: Primary | ICD-10-CM

## 2019-05-03 PROCEDURE — 99213 OFFICE O/P EST LOW 20 MIN: CPT | Performed by: ORTHOPAEDIC SURGERY

## 2019-05-03 NOTE — PROGRESS NOTES
Follow Up Visit     Rika Herrera returns today for follow up visit regarding right knee pain. She had a steroid shot at her last visit. She has also been doing physical therapy. She is doing much better. She has some occasional lateral pain but this is tolerable. Physical Exam:     Height: 5' 7\" (1.702 m), Weight: 160 lb (72.6 kg), BP: 135/87    On exam of the knee, there is some mild lateral joint line tenderness. Range of motion degrees. There is valgus alignment. The knee is stable to varus and valgus stress. Controlled Substances Monitoring:      Imaging:  No new imaging. Previous x-rays reviewed showing lateral compartment joint space narrowing of the right knee consistent with osteoarthritis      Assessment: Right knee osteoarthritis which has responded to steroid shot and therapy      Plan:   She will continue with normal activities. We will see her back in 3 months as needed.   We did discuss repeat injection for pain returns    Wang Milton MD  Orthopaedic Surgery   5/3/19  9:05 AM

## 2019-05-06 ENCOUNTER — HOSPITAL ENCOUNTER (OUTPATIENT)
Dept: PHYSICAL THERAPY | Age: 64
Setting detail: THERAPIES SERIES
Discharge: HOME | End: 2019-05-06
Payer: COMMERCIAL

## 2019-05-06 NOTE — FLOWSHEET NOTE
[facility]  Phone: [phone] Fax: [phone]     Physical Therapy  Cancellation/No-show Note  Patient Name:  Prateek Bernal  :  1955   Date:  2019    For today's appointment patient:  [x]  Cancelled  []  Rescheduled appointment  []  No-show     Reason given by patient:  []  Patient ill  []  Conflicting appointment  []  No transportation    []  Conflict with work  []  No reason given  []  Other:     Comments: Patient called to report Dr. Taina Mtz has released her and she can and will continue her exercises at home. Pt is discharged from physical therapy. Electronically signed by:   Ruslan Monroy

## 2019-05-08 ENCOUNTER — APPOINTMENT (OUTPATIENT)
Dept: PHYSICAL THERAPY | Age: 64
End: 2019-05-08
Payer: COMMERCIAL

## 2020-01-02 ENCOUNTER — HOSPITAL ENCOUNTER (EMERGENCY)
Age: 65
Discharge: HOME | End: 2020-01-02
Attending: EMERGENCY MEDICINE
Payer: COMMERCIAL

## 2020-01-02 ENCOUNTER — APPOINTMENT (OUTPATIENT)
Dept: GENERAL RADIOLOGY | Age: 65
End: 2020-01-02
Payer: COMMERCIAL

## 2020-01-02 VITALS
WEIGHT: 160 LBS | OXYGEN SATURATION: 98 % | HEART RATE: 80 BPM | HEIGHT: 67 IN | RESPIRATION RATE: 16 BRPM | SYSTOLIC BLOOD PRESSURE: 142 MMHG | BODY MASS INDEX: 25.11 KG/M2 | TEMPERATURE: 98 F | DIASTOLIC BLOOD PRESSURE: 84 MMHG

## 2020-01-02 LAB
ANION GAP SERPL CALCULATED.3IONS-SCNC: 11 MMOL/L (ref 7–16)
BASOPHILS ABSOLUTE: 0.05 E9/L (ref 0–0.2)
BASOPHILS RELATIVE PERCENT: 0.7 % (ref 0–2)
BUN BLDV-MCNC: 14 MG/DL (ref 8–23)
CALCIUM SERPL-MCNC: 9.3 MG/DL (ref 8.6–10.2)
CHLORIDE BLD-SCNC: 102 MMOL/L (ref 98–107)
CO2: 26 MMOL/L (ref 22–29)
CREAT SERPL-MCNC: 1 MG/DL (ref 0.5–1)
EOSINOPHILS ABSOLUTE: 0.1 E9/L (ref 0.05–0.5)
EOSINOPHILS RELATIVE PERCENT: 1.5 % (ref 0–6)
GFR AFRICAN AMERICAN: >60
GFR NON-AFRICAN AMERICAN: 56 ML/MIN/1.73
GLUCOSE BLD-MCNC: 97 MG/DL (ref 74–99)
HCT VFR BLD CALC: 38.5 % (ref 34–48)
HEMOGLOBIN: 12.7 G/DL (ref 11.5–15.5)
IMMATURE GRANULOCYTES #: 0.01 E9/L
IMMATURE GRANULOCYTES %: 0.1 % (ref 0–5)
LYMPHOCYTES ABSOLUTE: 2.36 E9/L (ref 1.5–4)
LYMPHOCYTES RELATIVE PERCENT: 35.1 % (ref 20–42)
MCH RBC QN AUTO: 30 PG (ref 26–35)
MCHC RBC AUTO-ENTMCNC: 33 % (ref 32–34.5)
MCV RBC AUTO: 91 FL (ref 80–99.9)
MONOCYTES ABSOLUTE: 0.73 E9/L (ref 0.1–0.95)
MONOCYTES RELATIVE PERCENT: 10.8 % (ref 2–12)
NEUTROPHILS ABSOLUTE: 3.48 E9/L (ref 1.8–7.3)
NEUTROPHILS RELATIVE PERCENT: 51.8 % (ref 43–80)
PDW BLD-RTO: 12.9 FL (ref 11.5–15)
PLATELET # BLD: 217 E9/L (ref 130–450)
PMV BLD AUTO: 10 FL (ref 7–12)
POTASSIUM SERPL-SCNC: 4.3 MMOL/L (ref 3.5–5)
PRO-BNP: 121 PG/ML (ref 0–125)
RBC # BLD: 4.23 E12/L (ref 3.5–5.5)
SODIUM BLD-SCNC: 139 MMOL/L (ref 132–146)
TROPONIN: <0.01 NG/ML (ref 0–0.03)
WBC # BLD: 6.7 E9/L (ref 4.5–11.5)

## 2020-01-02 PROCEDURE — 99285 EMERGENCY DEPT VISIT HI MDM: CPT

## 2020-01-02 PROCEDURE — 6370000000 HC RX 637 (ALT 250 FOR IP): Performed by: EMERGENCY MEDICINE

## 2020-01-02 PROCEDURE — 80048 BASIC METABOLIC PNL TOTAL CA: CPT

## 2020-01-02 PROCEDURE — 84484 ASSAY OF TROPONIN QUANT: CPT

## 2020-01-02 PROCEDURE — 36415 COLL VENOUS BLD VENIPUNCTURE: CPT

## 2020-01-02 PROCEDURE — 85025 COMPLETE CBC W/AUTO DIFF WBC: CPT

## 2020-01-02 PROCEDURE — 93005 ELECTROCARDIOGRAM TRACING: CPT | Performed by: EMERGENCY MEDICINE

## 2020-01-02 PROCEDURE — 83880 ASSAY OF NATRIURETIC PEPTIDE: CPT

## 2020-01-02 PROCEDURE — 71045 X-RAY EXAM CHEST 1 VIEW: CPT

## 2020-01-02 RX ORDER — METOPROLOL TARTRATE 50 MG/1
25 TABLET, FILM COATED ORAL ONCE
Status: COMPLETED | OUTPATIENT
Start: 2020-01-02 | End: 2020-01-02

## 2020-01-02 RX ORDER — ASPIRIN 81 MG/1
324 TABLET, CHEWABLE ORAL ONCE
Status: COMPLETED | OUTPATIENT
Start: 2020-01-02 | End: 2020-01-02

## 2020-01-02 RX ADMIN — ASPIRIN 81 MG 324 MG: 81 TABLET ORAL at 22:21

## 2020-01-02 RX ADMIN — METOPROLOL TARTRATE 25 MG: 50 TABLET ORAL at 22:21

## 2020-01-02 ASSESSMENT — PAIN DESCRIPTION - LOCATION: LOCATION: CHEST

## 2020-01-02 ASSESSMENT — PAIN SCALES - GENERAL
PAINLEVEL_OUTOF10: 2
PAINLEVEL_OUTOF10: 4

## 2020-01-02 ASSESSMENT — PAIN DESCRIPTION - DESCRIPTORS
DESCRIPTORS: PRESSURE
DESCRIPTORS: PRESSURE

## 2020-01-02 ASSESSMENT — PAIN DESCRIPTION - PAIN TYPE: TYPE: ACUTE PAIN

## 2020-01-02 ASSESSMENT — HEART SCORE: ECG: 1

## 2020-01-03 LAB
EKG ATRIAL RATE: 82 BPM
EKG P AXIS: 79 DEGREES
EKG P-R INTERVAL: 154 MS
EKG Q-T INTERVAL: 368 MS
EKG QRS DURATION: 78 MS
EKG QTC CALCULATION (BAZETT): 429 MS
EKG R AXIS: 57 DEGREES
EKG T AXIS: 51 DEGREES
EKG VENTRICULAR RATE: 82 BPM

## 2020-01-03 PROCEDURE — 93010 ELECTROCARDIOGRAM REPORT: CPT | Performed by: INTERNAL MEDICINE

## 2020-01-03 NOTE — ED PROVIDER NOTES
HPI:  1/2/20, Time: 9:59 PM        Shalonda Bustamante is a 59 y.o. female presenting to the ED for chest pressure on waking, beginning 8 hours ago. The complaint has been intermittent, moderate in severity, and worsened by nothing. Socially shortness of breath no nausea/vomiting, no diaphoresis, no palpitations, no syncope nor any near syncopal episodes associated. No radiation to the neck nor jaw nor to the left arm no radiation straight through to the back. No aggravating or relieving factors reported. No other complaints. Review of Systems:   Pertinent positives and negatives are stated within HPI, all other systems reviewed and are negative.    --------------------------------------------- PAST HISTORY ---------------------------------------------  Past Medical History:  has a past medical history of Depression, GERD (gastroesophageal reflux disease), Irregular heart beat, and Restless leg syndrome. Past Surgical History:  has a past surgical history that includes Upper gastrointestinal endoscopy (12/22/11); cardiovascular stress test (3/8/16); Colonoscopy; pr egd transoral biopsy single/multiple (6/27/2018); and Colonoscopy (6/27/2018). Social History:  reports that she has never smoked. She has never used smokeless tobacco. She reports that she does not drink alcohol or use drugs. Family History: family history includes Arrhythmia in her father; Colon Cancer in her mother; Coronary Art Dis in her mother; Diabetes in her brother and mother; Heart Surgery (age of onset: 76) in her mother; High Blood Pressure in her sister; Anup Altes in her father; Stroke in her maternal grandfather; Uterine Cancer in her maternal grandmother. The patients home medications have been reviewed. Allergies: Patient has no known allergies.     -------------------------------------------------- RESULTS -------------------------------------------------  All laboratory and radiology results have been personally reviewed by myself   LABS:  Results for orders placed or performed during the hospital encounter of 01/02/20   Troponin   Result Value Ref Range    Troponin <0.01 0.00 - 0.03 ng/mL   Brain Natriuretic Peptide   Result Value Ref Range    Pro- 0 - 125 pg/mL   CBC Auto Differential   Result Value Ref Range    WBC 6.7 4.5 - 11.5 E9/L    RBC 4.23 3.50 - 5.50 E12/L    Hemoglobin 12.7 11.5 - 15.5 g/dL    Hematocrit 38.5 34.0 - 48.0 %    MCV 91.0 80.0 - 99.9 fL    MCH 30.0 26.0 - 35.0 pg    MCHC 33.0 32.0 - 34.5 %    RDW 12.9 11.5 - 15.0 fL    Platelets 861 854 - 698 E9/L    MPV 10.0 7.0 - 12.0 fL    Neutrophils % 51.8 43.0 - 80.0 %    Immature Granulocytes % 0.1 0.0 - 5.0 %    Lymphocytes % 35.1 20.0 - 42.0 %    Monocytes % 10.8 2.0 - 12.0 %    Eosinophils % 1.5 0.0 - 6.0 %    Basophils % 0.7 0.0 - 2.0 %    Neutrophils Absolute 3.48 1.80 - 7.30 E9/L    Immature Granulocytes # 0.01 E9/L    Lymphocytes Absolute 2.36 1.50 - 4.00 E9/L    Monocytes Absolute 0.73 0.10 - 0.95 E9/L    Eosinophils Absolute 0.10 0.05 - 0.50 E9/L    Basophils Absolute 0.05 0.00 - 0.20 E5/H   Basic Metabolic Panel   Result Value Ref Range    Sodium 139 132 - 146 mmol/L    Potassium 4.3 3.5 - 5.0 mmol/L    Chloride 102 98 - 107 mmol/L    CO2 26 22 - 29 mmol/L    Anion Gap 11 7 - 16 mmol/L    Glucose 97 74 - 99 mg/dL    BUN 14 8 - 23 mg/dL    CREATININE 1.0 0.5 - 1.0 mg/dL    GFR Non-African American 56 >=60 mL/min/1.73    GFR African American >60     Calcium 9.3 8.6 - 10.2 mg/dL       RADIOLOGY:  Interpreted by Radiologist.  XR CHEST PORTABLE   Final Result      No airspace opacities or pleural effusion.                ------------------------- NURSING NOTES AND VITALS REVIEWED ---------------------------    The nursing notes within the ED encounter and vital signs as below have been reviewed.    BP (!) 142/84   Pulse 80   Temp 98 °F (36.7 °C) (Oral)   Resp 16   Ht 5' 7\" (1.702 m)   Wt 160 lb (72.6 kg)   SpO2 98%   BMI 25.06 kg/m²   Oxygen understands if she develops any worsening symptoms overnight she is to go immediately to the nearest hospital    EKG #1:  Interpreted by emergency department physician unless otherwise noted. Time:  21:49    Rate: 80  Rhythm: Sinus. Interpretation: normal EKG, normal sinus rhythm. Counseling: The emergency provider has spoken with the patient and family member patient and  and discussed todays results, in addition to providing specific details for the plan of care and counseling regarding the diagnosis and prognosis. Questions are answered at this time and they are agreeable with the plan.    --------------------------------- IMPRESSION AND DISPOSITION ---------------------------------    IMPRESSION  1. Chest wall pain        DISPOSITION  Disposition: Discharge to home  Patient condition is stable      NOTE: This report was transcribed using voice recognition software.  Every effort was made to ensure accuracy; however, inadvertent computerized transcription errors may be present        Dontae Arana MD  01/02/20 [address] [place] [address] MD Fred  01/02/20 [address] [place] [address] Avenue, MD  01/02/20 3561

## 2020-01-08 ENCOUNTER — PREP FOR PROCEDURE (OUTPATIENT)
Dept: SURGERY | Age: 65
End: 2020-01-08

## 2020-01-08 RX ORDER — SODIUM CHLORIDE 0.9 % (FLUSH) 0.9 %
10 SYRINGE (ML) INJECTION PRN
Status: CANCELLED | OUTPATIENT
Start: 2020-01-08

## 2020-01-08 RX ORDER — SODIUM CHLORIDE 9 MG/ML
INJECTION, SOLUTION INTRAVENOUS CONTINUOUS
Status: CANCELLED | OUTPATIENT
Start: 2020-01-08

## 2020-01-08 RX ORDER — SODIUM CHLORIDE 0.9 % (FLUSH) 0.9 %
10 SYRINGE (ML) INJECTION EVERY 12 HOURS SCHEDULED
Status: CANCELLED | OUTPATIENT
Start: 2020-01-08

## 2020-01-13 ENCOUNTER — HOSPITAL ENCOUNTER (OUTPATIENT)
Age: 65
Setting detail: OUTPATIENT SURGERY
Discharge: HOME | End: 2020-01-13
Attending: SURGERY | Admitting: SURGERY
Payer: COMMERCIAL

## 2020-01-13 VITALS
HEART RATE: 77 BPM | SYSTOLIC BLOOD PRESSURE: 112 MMHG | OXYGEN SATURATION: 98 % | RESPIRATION RATE: 16 BRPM | TEMPERATURE: 97 F | WEIGHT: 160 LBS | DIASTOLIC BLOOD PRESSURE: 70 MMHG | BODY MASS INDEX: 25.06 KG/M2

## 2020-01-13 LAB
C-REACTIVE PROTEIN: 0.2 MG/DL (ref 0–0.4)
SEDIMENTATION RATE, ERYTHROCYTE: 9 MM/HR (ref 0–20)

## 2020-01-13 PROCEDURE — 36415 COLL VENOUS BLD VENIPUNCTURE: CPT

## 2020-01-13 PROCEDURE — 86140 C-REACTIVE PROTEIN: CPT

## 2020-01-13 PROCEDURE — 3609012400 HC EGD TRANSORAL BIOPSY SINGLE/MULTIPLE: Performed by: SURGERY

## 2020-01-13 PROCEDURE — 85651 RBC SED RATE NONAUTOMATED: CPT

## 2020-01-13 PROCEDURE — 6360000002 HC RX W HCPCS: Performed by: SURGERY

## 2020-01-13 PROCEDURE — 88305 TISSUE EXAM BY PATHOLOGIST: CPT

## 2020-01-13 PROCEDURE — 2709999900 HC NON-CHARGEABLE SUPPLY: Performed by: SURGERY

## 2020-01-13 PROCEDURE — 7100000011 HC PHASE II RECOVERY - ADDTL 15 MIN: Performed by: SURGERY

## 2020-01-13 PROCEDURE — 99152 MOD SED SAME PHYS/QHP 5/>YRS: CPT | Performed by: SURGERY

## 2020-01-13 PROCEDURE — 7100000010 HC PHASE II RECOVERY - FIRST 15 MIN: Performed by: SURGERY

## 2020-01-13 PROCEDURE — 2580000003 HC RX 258: Performed by: SURGERY

## 2020-01-13 PROCEDURE — 88342 IMHCHEM/IMCYTCHM 1ST ANTB: CPT

## 2020-01-13 PROCEDURE — 99153 MOD SED SAME PHYS/QHP EA: CPT | Performed by: SURGERY

## 2020-01-13 PROCEDURE — 6370000000 HC RX 637 (ALT 250 FOR IP): Performed by: SURGERY

## 2020-01-13 PROCEDURE — 3609010300 HC COLONOSCOPY W/BIOPSY SINGLE/MULTIPLE: Performed by: SURGERY

## 2020-01-13 RX ORDER — SODIUM CHLORIDE 0.9 % (FLUSH) 0.9 %
10 SYRINGE (ML) INJECTION PRN
Status: DISCONTINUED | OUTPATIENT
Start: 2020-01-13 | End: 2020-01-13 | Stop reason: HOSPADM

## 2020-01-13 RX ORDER — SODIUM CHLORIDE 9 MG/ML
INJECTION, SOLUTION INTRAVENOUS CONTINUOUS
Status: DISCONTINUED | OUTPATIENT
Start: 2020-01-13 | End: 2020-01-13 | Stop reason: HOSPADM

## 2020-01-13 RX ORDER — SUCRALFATE 1 G/1
1 TABLET ORAL
Qty: 90 TABLET | Refills: 3 | Status: SHIPPED | OUTPATIENT
Start: 2020-01-13 | End: 2022-04-12

## 2020-01-13 RX ORDER — MIDAZOLAM HYDROCHLORIDE 1 MG/ML
INJECTION INTRAMUSCULAR; INTRAVENOUS PRN
Status: DISCONTINUED | OUTPATIENT
Start: 2020-01-13 | End: 2020-01-13 | Stop reason: ALTCHOICE

## 2020-01-13 RX ORDER — MEPERIDINE HYDROCHLORIDE 50 MG/ML
INJECTION INTRAMUSCULAR; INTRAVENOUS; SUBCUTANEOUS PRN
Status: DISCONTINUED | OUTPATIENT
Start: 2020-01-13 | End: 2020-01-13 | Stop reason: ALTCHOICE

## 2020-01-13 RX ORDER — SODIUM CHLORIDE 0.9 % (FLUSH) 0.9 %
10 SYRINGE (ML) INJECTION EVERY 12 HOURS SCHEDULED
Status: DISCONTINUED | OUTPATIENT
Start: 2020-01-13 | End: 2020-01-13 | Stop reason: HOSPADM

## 2020-01-13 RX ADMIN — SODIUM CHLORIDE: 9 INJECTION, SOLUTION INTRAVENOUS at 09:01

## 2020-01-13 ASSESSMENT — PAIN SCALES - GENERAL
PAINLEVEL_OUTOF10: 0

## 2020-01-13 NOTE — PROGRESS NOTES
Passing flatus. Tolerating fluids well. Discharge instructions given and patient/family verbalizes understanding. Transported to vehicle via wheelchair in stable condition.

## 2020-01-13 NOTE — PROGRESS NOTES
Patient admitted to secondary recovery, patient vitals stable, will continue to monitor. Tolerating PO.

## 2020-01-13 NOTE — PRE SEDATION
Sedation Pre-Procedure Note    Patient Name: Hugh Chauhan   YOB: 1955  Room/Bed: [facility] ROOM/NONE  Medical Record Number: [id number]  Date: 1/13/2020   Time: 10:57 AM       Indication:  Reflux    Consent: I have discussed with the patient and/or the patient representative the indication, alternatives, and the possible risks and/or complications of the planned procedure and the anesthesia methods. The patient and/or patient representative appear to understand and agree to proceed. Vital Signs:   Vitals:    01/13/20 0844   BP: 133/73   Pulse: 110   Resp: 16   Temp: 97.9 °F (36.6 °C)   SpO2: 99%       Past Medical History:   has a past medical history of Depression, GERD (gastroesophageal reflux disease), Irregular heart beat, and Restless leg syndrome. Past Surgical History:   has a past surgical history that includes Upper gastrointestinal endoscopy (12/22/11); cardiovascular stress test (3/8/16); Colonoscopy; pr egd transoral biopsy single/multiple (6/27/2018); and Colonoscopy (6/27/2018). Medications:   Scheduled Meds:    sodium chloride flush  10 mL Intravenous 2 times per day     Continuous Infusions:    sodium chloride 42 mL/hr at 01/13/20 0901     PRN Meds: sodium chloride flush, butamben-tetracaine-benzocaine, meperidine, midazolam  Home Meds:   Prior to Admission medications    Medication Sig Start Date End Date Taking? Authorizing Provider   gabapentin (NEURONTIN) 300 MG capsule Take 300 mg by mouth nightly   Yes Historical Provider, MD   buPROPion (WELLBUTRIN XL) 150 MG XL tablet Take 150 mg by mouth 2 times daily. Yes Historical Provider, MD   duloxetine (CYMBALTA) 60 MG capsule Take 60 mg by mouth daily.      Yes Historical Provider, MD     Coumadin Use Last 7 Days:  no  Antiplatelet drug therapy use last 7 days: no  Other anticoagulant use last 7 days: no  Additional Medication Information:  none      Pre-Sedation Documentation and Exam:   I have personally completed a

## 2020-01-13 NOTE — DISCHARGE INSTR - COC
Resp 25   Wt 160 lb (72.6 kg)   SpO2 96%   BMI 25.06 kg/m²     Last documented pain score (0-10 scale):    Last Weight:   Wt Readings from Last 1 Encounters:   20 160 lb (72.6 kg)     Mental Status:  {IP PT MENTAL STATUS:}    IV Access:  { BASIA IV ACCESS:945391128}    Nursing Mobility/ADLs:  Walking   {P DME XPLR:578818807}  Transfer  {P DME RGRH:007050883}  Bathing  {CHP DME TNMP:410925480}  Dressing  {CHP DME ILSR:096571965}  Toileting  {P DME XZWZ:384221641}  Feeding  {[facility] DME RTRH:699557937}  Med Admin  {P DME RHJF:478749105}  Med Delivery   { BASIA MED Delivery:710766265}    Wound Care Documentation and Therapy:        Elimination:  Continence:   · Bowel: {YES / VU:17154}  · Bladder: {YES / FK:94664}  Urinary Catheter: {Urinary Catheter:077674011}   Colostomy/Ileostomy/Ileal Conduit: {YES / OS:36445}       Date of Last BM: ***  No intake or output data in the 24 hours ending 20 1102  No intake/output data recorded.     Safety Concerns:     [employer] Safety Concerns:726128424}    Impairments/Disabilities:      [employer] Impairments/Disabilities:494189383}    Nutrition Therapy:  Current Nutrition Therapy:   [employer] Diet List:744150009}    Routes of Feeding: {[facility] DME Other Feedings:116002972}  Liquids: {Slp liquid thickness:35443}  Daily Fluid Restriction: {CHP DME Yes amt example:214388439}  Last Modified Barium Swallow with Video (Video Swallowing Test): {Done Not Done BUJY:872629784}    Treatments at the Time of Hospital Discharge:   Respiratory Treatments: ***  Oxygen Therapy:  {Therapy; copd oxygen:16255}  Ventilator:    { CC Vent ADFT:989647860}    Rehab Therapies: {THERAPEUTIC INTERVENTION:0369039056}  Weight Bearing Status/Restrictions: [address] [employer]  Weight Bearin}  Other Medical Equipment (for information only, NOT a DME order):  {EQUIPMENT:357528386}  Other Treatments: ***    Patient's personal belongings (please select all that are sent with patient):  {[facility] DME Belongings:928546572}    RN SIGNATURE:  {Esignature:094304827}    CASE MANAGEMENT/SOCIAL WORK SECTION    Inpatient Status Date: ***    Readmission Risk Assessment Score:  Readmission Risk              Risk of Unplanned Readmission:        7           Discharging to Facility/ Agency   · Name:   · Address:  · Phone:  · Fax:    Dialysis Facility (if applicable)   · Name:  · Address:  · Dialysis Schedule:  · Phone:  · Fax:    / signature: {Esignature:257780576}    PHYSICIAN SECTION    Prognosis: {Prognosis:9495012188}    Condition at Discharge: [address] [place] [address] Patient Condition:903136362}    Rehab Potential (if transferring to Rehab): {Prognosis:0489207001}    Recommended Labs or Other Treatments After Discharge: ***    Physician Certification: I certify the above information and transfer of Ang Preciado  is necessary for the continuing treatment of the diagnosis listed and that she requires {Admit to Appropriate Level of Care:98527} for {GREATER/LESS:639075869} 30 days.      Update Admission H&P: {[facility]ME [facility]85[facility]2488}    PHYSICIAN SIGNATURE:  {Esignature:360058525}

## 2020-01-14 NOTE — OP NOTE
petechial  hemorrhages consistent with nonspecific colitis and what appeared to be  a granuloma at approximately 35 cm from the anal verge which were  biopsied. All consistent with a nonspecific colitis. Remaining portion  of the descending colon, transverse and ascending colon did not exhibit  any mucosal pathology. With the patient in the supine position, the  ileocecal valve was well visualized. Endoscope was progressively  withdrawn with similar findings. Site of biopsy was hemostatically  adequate. The patient was informed finding as well as the patient's  . Appropriate recommendations were given. The patient will be  followed up on an outpatient basis in my office for further management  as needed.     EBL 0    Razia Randhawa MD    D: 01/13/2020 13:29:06       T: 01/13/2020 13:32:33     [email or web address]  Job#: [id number]     Doc#: [id number]    CC:

## 2020-07-01 ENCOUNTER — HOSPITAL ENCOUNTER (EMERGENCY)
Age: 65
Discharge: HOME | End: 2020-07-01
Attending: FAMILY MEDICINE
Payer: COMMERCIAL

## 2020-07-01 ENCOUNTER — APPOINTMENT (OUTPATIENT)
Dept: GENERAL RADIOLOGY | Age: 65
End: 2020-07-01
Payer: COMMERCIAL

## 2020-07-01 VITALS
WEIGHT: 160 LBS | TEMPERATURE: 96.5 F | SYSTOLIC BLOOD PRESSURE: 120 MMHG | OXYGEN SATURATION: 98 % | BODY MASS INDEX: 25.11 KG/M2 | HEIGHT: 67 IN | RESPIRATION RATE: 18 BRPM | DIASTOLIC BLOOD PRESSURE: 82 MMHG | HEART RATE: 83 BPM

## 2020-07-01 PROCEDURE — 96372 THER/PROPH/DIAG INJ SC/IM: CPT

## 2020-07-01 PROCEDURE — 71101 X-RAY EXAM UNILAT RIBS/CHEST: CPT

## 2020-07-01 PROCEDURE — 6360000002 HC RX W HCPCS: Performed by: FAMILY MEDICINE

## 2020-07-01 PROCEDURE — 99283 EMERGENCY DEPT VISIT LOW MDM: CPT

## 2020-07-01 RX ORDER — HYDROCODONE BITARTRATE AND ACETAMINOPHEN 5; 325 MG/1; MG/1
1 TABLET ORAL EVERY 8 HOURS PRN
Qty: 9 TABLET | Refills: 0 | Status: SHIPPED | OUTPATIENT
Start: 2020-07-01 | End: 2020-07-04

## 2020-07-01 RX ORDER — KETOROLAC TROMETHAMINE 30 MG/ML
60 INJECTION, SOLUTION INTRAMUSCULAR; INTRAVENOUS ONCE
Status: COMPLETED | OUTPATIENT
Start: 2020-07-01 | End: 2020-07-01

## 2020-07-01 RX ADMIN — KETOROLAC TROMETHAMINE 60 MG: 30 INJECTION, SOLUTION INTRAMUSCULAR at 23:27

## 2020-07-01 ASSESSMENT — PAIN SCALES - GENERAL
PAINLEVEL_OUTOF10: 10
PAINLEVEL_OUTOF10: 5

## 2020-07-01 ASSESSMENT — PAIN DESCRIPTION - PAIN TYPE: TYPE: ACUTE PAIN

## 2020-07-01 ASSESSMENT — PAIN DESCRIPTION - ORIENTATION: ORIENTATION: LEFT

## 2020-07-01 ASSESSMENT — PAIN DESCRIPTION - LOCATION: LOCATION: RIB CAGE

## 2020-07-02 NOTE — ED PROVIDER NOTES
HPI:  7/1/20,   Time: 10:27 PM EDT         Ramone Prakash is a 72 y.o. female presenting to the ED for acute onset of pain and injury to the right ribs when she was walking on her porch and tripped and fell into a part of the porch railing that was protruding and struck the right lower part of her chest wall. At rest she has no pain. She has no pain with normal breathing. The pain is a sharp pain, intermittent, present with deep breathing and a change of position. ROS:   Pertinent positives and negatives are stated within HPI, all other systems reviewed and are negative.  --------------------------------------------- PAST HISTORY ---------------------------------------------  Past Medical History:  has a past medical history of Depression, GERD (gastroesophageal reflux disease), Irregular heart beat, and Restless leg syndrome. Past Surgical History:  has a past surgical history that includes Upper gastrointestinal endoscopy (12/22/11); cardiovascular stress test (3/8/16); Colonoscopy; pr egd transoral biopsy single/multiple (6/27/2018); Colonoscopy (6/27/2018); Upper gastrointestinal endoscopy (N/A, 1/13/2020); and Colonoscopy (N/A, 1/13/2020). Social History:  reports that she has never smoked. She has never used smokeless tobacco. She reports that she does not drink alcohol or use drugs. Family History: family history includes Arrhythmia in her father; Colon Cancer in her mother; Coronary Art Dis in her mother; Diabetes in her brother and mother; Heart Surgery (age of onset: 76) in her mother; High Blood Pressure in her sister; Arletta Bolus in her father; Stroke in her maternal grandfather; Uterine Cancer in her maternal grandmother. The patients home medications have been reviewed. Allergies: Patient has no known allergies.     -------------------------------------------------- RESULTS -------------------------------------------------  All laboratory and radiology results have been personally reviewed by myself   LABS:  No results found for this visit on 07/01/20. RADIOLOGY:  Interpreted by Radiologist.  XR RIBS RIGHT INCLUDE CHEST (MIN 3 VIEWS)   Final Result   Minimally displaced acute fracture of the right 6th rib. No superimposed acute cardiopulmonary process. ------------------------- NURSING NOTES AND VITALS REVIEWED ---------------------------   The nursing notes within the ED encounter and vital signs as below have been reviewed. /82   Pulse 83   Temp 96.5 °F (35.8 °C) (Temporal)   Resp 18   Ht 5' 7\" (1.702 m)   Wt 160 lb (72.6 kg)   SpO2 98%   BMI 25.06 kg/m²   Oxygen Saturation Interpretation: Normal      ---------------------------------------------------PHYSICAL EXAM--------------------------------------    Constitutional/General: Alert and oriented x3, well appearing, non toxic in NAD  Head: NC/AT  Eyes: PERRL, EOMI  Mouth: Oropharynx clear, handling secretions, no trismus  Neck: Supple, full ROM, no meningeal signs  Pulmonary: Lungs clear to auscultation bilaterally, no wheezes, rales, or rhonchi. Not in respiratory distress; no crepitus was auscultated over the painful rib area. Cardiovascular:  Regular rate and rhythm, no murmurs, gallops, or rubs. 2+ distal pulses  Chest:  There is right para lateral and lower rib tenderness to palpation. No deformity was visualized, no ecchymosis was seen. Abdomen: Soft, non tender, non distended,   Extremities: Moves all extremities x 4. Warm and well perfused  Skin: warm and dry without rash  Neurologic: GCS 15,  Psych: Normal Affect      ------------------------------ ED COURSE/MEDICAL DECISION MAKING----------------------  Medications   ketorolac (TORADOL) injection 60 mg (60 mg Intramuscular Given 7/1/20 1367)         Medical Decision Making:    Straightforward    Counseling:    The emergency provider has spoken with the patient and discussed todays results, in addition to providing specific details for the plan of care and counseling regarding the diagnosis and prognosis. Questions are answered at this time and they are agreeable with the plan.      --------------------------------- IMPRESSION AND DISPOSITION ---------------------------------    IMPRESSION  1.  Closed fracture of six ribs of right side, initial encounter        DISPOSITION  Disposition: Discharge to home  Patient condition is stable                 Neto Hess MD  07/02/20 0105

## 2020-07-02 NOTE — ED NOTES
Instructions provided and questions answered. Prescriptions verified with patient.       Beltran Sanchez RN  07/01/20 1728

## 2022-04-26 DIAGNOSIS — M81.0 OSTEOPOROSIS, UNSPECIFIED OSTEOPOROSIS TYPE, UNSPECIFIED PATHOLOGICAL FRACTURE PRESENCE: ICD-10-CM

## 2022-07-07 NOTE — PROGRESS NOTES
New Knee Patient     Referring Provider:   No referring provider defined for this encounter. CHIEF COMPLAINT:   Chief Complaint   Patient presents with    Knee Pain     Pt presents this AM with c/o L knee pain. States that she has noticed L knee pain over the past 2 months. States that she is unsure of the specific cause. Pain in knee is lateral and inferior to patealla, and pt describes pain as aching and burning. Has been taking Tylenol at home to manage her symptoms. HPI:    Lena Sotelo is a 79y.o. year old female well-known to us for previous treatment of right knee pain several years ago, now presenting with left knee pain. The pain has progressed over the past several months. It feels similar to her right knee. She had excellent relief from a steroid injection a few years ago on the right side and has required no further treatment. Her left knee is bothersome on uneven ground. She is about to retire soon. [place]     PAST MEDICAL HISTORY  Past Medical History:   Diagnosis Date    Depression     GERD (gastroesophageal reflux disease)     Irregular heart beat     Restless leg syndrome        PAST SURGICAL HISTORY  Past Surgical History:   Procedure Laterality Date    CARDIOVASCULAR STRESS TEST  3/8/16    COLONOSCOPY      COLONOSCOPY  6/27/2018    COLONOSCOPY WITH BIOPSY performed by Moise Sharp MD at [address] [facility] COLONOSCOPY N/A 1/13/2020    COLONOSCOPY WITH BIOPSY performed by Moise Sharp MD at [address] [place] [address] EGD TRANSORAL BIOPSY SINGLE/MULTIPLE  6/27/2018    EGD BIOPSY performed by Moise Sharp MD at [place] [address]  12/22/11    antral ulcers    UPPER GASTROINTESTINAL ENDOSCOPY N/A 1/13/2020    EGD BIOPSY performed by Moise Sharp MD at [address] [place] [address]   Family History   Problem Relation Age of Onset    Coronary Art Dis Mother     Heart Surgery Mother 76        cabg    Diabetes Mother     Colon Cancer Mother     Lung Cancer Father     Arrhythmia Father     High Blood Pressure Sister     Diabetes Brother     Uterine Cancer Maternal Grandmother     Stroke Maternal Grandfather        SOCIAL HISTORY  Social History     Socioeconomic History    Marital status:      Spouse name: Not on file    Number of children: Not on file    Years of education: Not on file    Highest education level: Not on file   Occupational History    Not on file   Tobacco Use    Smoking status: Never Smoker    Smokeless tobacco: Never Used   Substance and Sexual Activity    Alcohol use: No     Comment:      Drug use: No    Sexual activity: Not on file   Other Topics Concern    Not on file   Social History Narrative    2-3 cans diet pepsi daily; occ tea     Social Determinants of Health     Financial Resource Strain:     Difficulty of Paying Living Expenses: Not on file   Food Insecurity:     Worried About Running Out of Food in the Last Year: Not on file    Chelly of Food in the Last Year: Not on file   Transportation Needs:     Lack of Transportation (Medical): Not on file    Lack of Transportation (Non-Medical):  Not on file   Physical Activity:     Days of Exercise per Week: Not on file    Minutes of Exercise per Session: Not on file   Stress:     Feeling of Stress : Not on file   Social Connections:     Frequency of Communication with Friends and Family: Not on file    Frequency of Social Gatherings with Friends and Family: Not on file    Attends Zoroastrianism Services: Not on file    Active Member of Clubs or Organizations: Not on file    Attends Club or Organization Meetings: Not on file    Marital Status: Not on file   Intimate Partner Violence:     Fear of Current or Ex-Partner: Not on file    Emotionally Abused: Not on file    Physically Abused: Not on file    Sexually Abused: Not on file   Housing Stability:     Unable to Pay for Housing in the Last Year: Not on file    Number of Places Lived in the Last Year: Not on file    Unstable Housing in the Last Year: Not on file     Social History     Occupational History    Not on file   Tobacco Use    Smoking status: Never Smoker    Smokeless tobacco: Never Used   Substance and Sexual Activity    Alcohol use: No     Comment:      Drug use: No    Sexual activity: Not on file       CURRENT MEDICATIONS     Current Outpatient Medications:     gabapentin (NEURONTIN) 300 MG capsule, Take 300 mg by mouth nightly, Disp: , Rfl:     buPROPion (WELLBUTRIN XL) 150 MG XL tablet, Take 150 mg by mouth 2 times daily. , Disp: , Rfl:     duloxetine (CYMBALTA) 60 MG capsule, Take 60 mg by mouth daily. , Disp: , Rfl:     ALLERGIES  Allergies   Allergen Reactions    Codeine        Controlled Substances Monitoring:          REVIEW OF SYSTEMS:     Constitutional:  Negative for weight loss, fevers, chills, fatigue  Cardiovascular: Negative for chest pain, palpitations  Pulmonary: Negative for shortness of breath, labored breathing, cough  GI: negative for abdominal pain, nausea, vomitting   MSK: per HPI  Skin: negative for rash, open wounds    All other systems reviewed and are negative         PHYSICAL EXAM     Vitals:    07/11/22 0936   Weight: 165 lb (74.8 kg)   Height: 5' 7\" (1.702 m)       Height: 5' 7\" (1.702 m)  Weight: [unfilled]  BMI:  Body mass index is 25.84 kg/m². General: The patient is alert and oriented x 3, appears to be stated age and in no distress. HEENT: head is normocephalic, atraumatic. EOMI. Neck: supple, trachea midline, no thyromegaly   Cardiovascular: peripheral pulses palpable.   Normal Capillary refill   Respiratory: breathing unlabored, chest expansion symmetric   Skin: no rash, no open wounds, no erythema  Psych: normal affect; mood stable  Neurologic: gait normal, sensation grossly intact in extremities  MSK:        Lower Extremity:   Ipsilateral hip exam shows normal range of motion without pain with impingement testing. Exam of the knee today shows lateral joint line tenderness. Range of motion is about 5 to 120 degrees. The knee is stable. There is no effusion           IMAGING:    XR: 4 views of the knee including weightbearing view show lateral compartment near bone-on-bone arthritis with osteophyte formation. Also visualized right knee lateral compartment moderate to severe arthritis, worse than the left    Impression: Bilateral knee lateral compartment osteoarthritis    Procedure Note: Knee Cortisone injection     The left knee was identified as the injection site. The risk and benefits of a cortisone injection were explained and the patient consented to the injection. Under sterile conditions, the knee was injected with a mixture of 40 mg of Kenalog and 4 mL of 1% Lidocaine without complication. A sterile bandage was applied. Administrations This Visit     lidocaine 1 % injection 4 mL     Admin Date  07/11/2022 Action  Given Dose  4 mL Route  Intra-artICUlar Administered By  Yuridia Hernandez MA          triamcinolone acetonide (KENALOG-40) injection 40 mg     Admin Date  07/11/2022 Action  Given Dose  40 mg Route  Intra-artICUlar Administered By  Yuridia Hernandez MA                      ASSESSMENT  Left knee osteoarthritis    PLAN  We discussed her knee today. She had good relief from a steroid injection on the right several years ago. She would like to try a shot of the left side. This was performed. She will continue low impact exercises.   We will see her back in 3 months as needed if her pain returns        Abby Martinez MD  Orthopaedic Surgery   7/11/22  12:51 PM

## 2022-07-11 ENCOUNTER — OFFICE VISIT (OUTPATIENT)
Dept: ORTHOPEDIC SURGERY | Age: 67
End: 2022-07-11
Payer: COMMERCIAL

## 2022-07-11 VITALS — BODY MASS INDEX: 25.9 KG/M2 | WEIGHT: 165 LBS | HEIGHT: 67 IN

## 2022-07-11 DIAGNOSIS — G89.29 CHRONIC PAIN OF RIGHT KNEE: Primary | ICD-10-CM

## 2022-07-11 DIAGNOSIS — M25.561 CHRONIC PAIN OF RIGHT KNEE: Primary | ICD-10-CM

## 2022-07-11 PROCEDURE — 20610 DRAIN/INJ JOINT/BURSA W/O US: CPT | Performed by: ORTHOPAEDIC SURGERY

## 2022-07-11 PROCEDURE — 1123F ACP DISCUSS/DSCN MKR DOCD: CPT | Performed by: ORTHOPAEDIC SURGERY

## 2022-07-11 PROCEDURE — 99203 OFFICE O/P NEW LOW 30 MIN: CPT | Performed by: ORTHOPAEDIC SURGERY

## 2022-07-11 RX ORDER — LIDOCAINE HYDROCHLORIDE 10 MG/ML
4 INJECTION, SOLUTION INFILTRATION; PERINEURAL ONCE
Status: COMPLETED | OUTPATIENT
Start: 2022-07-11 | End: 2022-07-11

## 2022-07-11 RX ORDER — TRIAMCINOLONE ACETONIDE 40 MG/ML
40 INJECTION, SUSPENSION INTRA-ARTICULAR; INTRAMUSCULAR ONCE
Status: COMPLETED | OUTPATIENT
Start: 2022-07-11 | End: 2022-07-11

## 2022-07-11 RX ADMIN — TRIAMCINOLONE ACETONIDE 40 MG: 40 INJECTION, SUSPENSION INTRA-ARTICULAR; INTRAMUSCULAR at 10:22

## 2022-07-11 RX ADMIN — LIDOCAINE HYDROCHLORIDE 4 ML: 10 INJECTION, SOLUTION INFILTRATION; PERINEURAL at 10:21

## 2022-11-28 ENCOUNTER — OFFICE VISIT (OUTPATIENT)
Dept: ORTHOPEDIC SURGERY | Age: 67
End: 2022-11-28
Payer: MEDICARE

## 2022-11-28 VITALS — HEIGHT: 67 IN | BODY MASS INDEX: 25.9 KG/M2 | WEIGHT: 165 LBS

## 2022-11-28 DIAGNOSIS — M25.522 LEFT ELBOW PAIN: ICD-10-CM

## 2022-11-28 DIAGNOSIS — G89.29 CHRONIC PAIN OF LEFT KNEE: ICD-10-CM

## 2022-11-28 DIAGNOSIS — M25.562 CHRONIC PAIN OF LEFT KNEE: ICD-10-CM

## 2022-11-28 DIAGNOSIS — M17.12 PRIMARY OSTEOARTHRITIS OF LEFT KNEE: Primary | ICD-10-CM

## 2022-11-28 PROCEDURE — 20610 DRAIN/INJ JOINT/BURSA W/O US: CPT | Performed by: NURSE PRACTITIONER

## 2022-11-28 PROCEDURE — G8427 DOCREV CUR MEDS BY ELIG CLIN: HCPCS | Performed by: NURSE PRACTITIONER

## 2022-11-28 PROCEDURE — G8484 FLU IMMUNIZE NO ADMIN: HCPCS | Performed by: NURSE PRACTITIONER

## 2022-11-28 PROCEDURE — 1036F TOBACCO NON-USER: CPT | Performed by: NURSE PRACTITIONER

## 2022-11-28 PROCEDURE — 1090F PRES/ABSN URINE INCON ASSESS: CPT | Performed by: NURSE PRACTITIONER

## 2022-11-28 PROCEDURE — 1123F ACP DISCUSS/DSCN MKR DOCD: CPT | Performed by: NURSE PRACTITIONER

## 2022-11-28 PROCEDURE — 3017F COLORECTAL CA SCREEN DOC REV: CPT | Performed by: NURSE PRACTITIONER

## 2022-11-28 PROCEDURE — G8400 PT W/DXA NO RESULTS DOC: HCPCS | Performed by: NURSE PRACTITIONER

## 2022-11-28 PROCEDURE — G8417 CALC BMI ABV UP PARAM F/U: HCPCS | Performed by: NURSE PRACTITIONER

## 2022-11-28 PROCEDURE — 99214 OFFICE O/P EST MOD 30 MIN: CPT | Performed by: NURSE PRACTITIONER

## 2022-11-28 RX ORDER — TRIAMCINOLONE ACETONIDE 40 MG/ML
40 INJECTION, SUSPENSION INTRA-ARTICULAR; INTRAMUSCULAR ONCE
Status: COMPLETED | OUTPATIENT
Start: 2022-11-28 | End: 2022-11-28

## 2022-11-28 RX ORDER — ALENDRONATE SODIUM 70 MG/1
70 TABLET ORAL
COMMUNITY

## 2022-11-28 RX ORDER — LIDOCAINE HYDROCHLORIDE 10 MG/ML
4 INJECTION, SOLUTION INFILTRATION; PERINEURAL ONCE
Status: COMPLETED | OUTPATIENT
Start: 2022-11-28 | End: 2022-11-28

## 2022-11-28 RX ADMIN — LIDOCAINE HYDROCHLORIDE 4 ML: 10 INJECTION, SOLUTION INFILTRATION; PERINEURAL at 14:42

## 2022-11-28 RX ADMIN — TRIAMCINOLONE ACETONIDE 40 MG: 40 INJECTION, SUSPENSION INTRA-ARTICULAR; INTRAMUSCULAR at 14:43

## 2022-11-28 NOTE — PATIENT INSTRUCTIONS
Obtain topical diclofenac ointment over-the-counter, this can be applied directly over the elbow    Can take oral NSAID's (Advil, Motrin, Naproxen, Aleve), and Acetaminophen (Tylenol) as needed for symptom relief.  Unless instructed to avoid by your Primary Care Provider     Refer to your home exercise handout, to perform independently at home    Obtain counterforce strap over-the-counter to be worn on the forearm during the day with activities

## 2022-11-28 NOTE — PROGRESS NOTES
New Elbow Patient Visit     Referring Provider:   No referring provider defined for this encounter. CHIEF COMPLAINT:   Chief Complaint   Patient presents with    Follow-up     Left knee osteoarthritis, 2 / 10 , Lt knee cortisone injection 7/11/2022     Elbow Pain     C/o left elbow pain, denies injury , Rt handed - she feels overused the left elbow        HPI:      Chris Crowder is a 79y.o. year old female who is seen today  for evaluation of left elbow pain. Patient is known to the practice and follows up today for known left knee osteoarthritis. She reports gradual onset of left elbow pain over the last month or so without injury. She reports tenderness to the medial aspect of her elbow. Reports pain with range of motion and daily activities. Denies previous treatments. Denies mechanical symptoms or feelings of instability. In regards to her left knee she reports previous cortisone injection provided symptom relief lasting up until 1 month ago. She reports symptoms have been gradually returning and would like to repeat cortisone injection today.       PAST MEDICAL HISTORY  Past Medical History:   Diagnosis Date    Depression     GERD (gastroesophageal reflux disease)     Irregular heart beat     Restless leg syndrome        PAST SURGICAL HISTORY  Past Surgical History:   Procedure Laterality Date    CARDIOVASCULAR STRESS TEST  3/8/16    COLONOSCOPY      COLONOSCOPY  6/27/2018    COLONOSCOPY WITH BIOPSY performed by Alfred Serra MD at [address] [facility] N/A 1/13/2020    COLONOSCOPY WITH BIOPSY performed by Alfred Serra MD at [address] EGD TRANSORAL BIOPSY SINGLE/MULTIPLE  6/27/2018    EGD BIOPSY performed by Alfred Serra MD at [address]  12/22/11    antral ulcers    UPPER GASTROINTESTINAL ENDOSCOPY N/A 1/13/2020    EGD BIOPSY performed by Alfred Serra MD at [address] [facility]   Family History   Problem Relation Age of Onset    Coronary Art Dis Mother     Heart Surgery Mother 76        cabg    Diabetes Mother     Colon Cancer Mother     Lung Cancer Father     Arrhythmia Father     High Blood Pressure Sister     Diabetes Brother     Uterine Cancer Maternal Grandmother     Stroke Maternal Grandfather        SOCIAL HISTORY  Social History     Occupational History    Not on file   Tobacco Use    Smoking status: Never    Smokeless tobacco: Never   Substance and Sexual Activity    Alcohol use: No     Comment:      Drug use: No    Sexual activity: Not on file       CURRENT MEDICATIONS     Current Outpatient Medications:     alendronate (FOSAMAX) 70 MG tablet, Take 70 mg by mouth every 7 days, Disp: , Rfl:     gabapentin (NEURONTIN) 300 MG capsule, Take 300 mg by mouth nightly, Disp: , Rfl:     buPROPion (WELLBUTRIN XL) 150 MG XL tablet, Take 150 mg by mouth 2 times daily. , Disp: , Rfl:     duloxetine (CYMBALTA) 60 MG capsule, Take 60 mg by mouth in the morning and at bedtime, Disp: , Rfl:     ALLERGIES  Allergies   Allergen Reactions    Codeine        Controlled Substances Monitoring:        REVIEW OF SYSTEMS:     Constitutional:  Negative for weight loss, fevers, chills, fatigue  Cardiovascular: Negative for chest pain, palpitations  Pulmonary: Negative for shortness of breath, labored breathing, cough  GI: negative for abdominal pain, nausea, vomitting   MSK: per HPI  Skin: negative for rash, open wounds    All other systems reviewed and are negative           PHYSICAL EXAM     Vitals:    11/28/22 1359   Weight: 165 lb (74.8 kg)   Height: 5' 7\" (1.702 m)         Height: 5' 7\" (1.702 m)  Weight: [unfilled]  BMI:  Body mass index is 25.84 kg/m². General: The patient is alert and oriented x 3, appears to be stated age and in no distress. HEENT: head is normocephalic, atraumatic. EOMI. Neck: supple, trachea midline, no thyromegaly   Cardiovascular: peripheral pulses palpable.   Normal Capillary refill Respiratory: breathing unlabored, chest expansion symmetric   Skin: no rash, no open wounds, no erythema  Psych: normal affect; mood stable  Neurologic: gait normal, sensation grossly intact in extremities  MSK:    Shoulder:  Ipsilateral shoulder has normal range of motion, with no deformity. Normal musculature without atrophy    Left Elbow Exam:     There is not visible deformity of the elbow. Carrying angle is normal  Range of motion is 0 to 140. There is pain with motion  Pronation/supination is painful   There is not tenderness to palpation over the lateral humeral epicondyle  There is tenderness to palpation over the medial humeral epicondyle  There is pain with resisted wrist extension  There is not posterolateral or posteromedial instability    Left knee exam range of motion full, positive joint line tenderness with palpation. No swelling deformity or palpable effusion. Valgus knee on inspection. Valgus varus stress intact. Patella stable tracks midline. Knee stable on exam    IMAGING:     XRAY:  3 views of the left elbow show no bony abnormality. Joint spaces maintained. No fracture/dislocation. Radiographic findings reviewed with patient    Procedure Note: Knee Cortisone injection     The left knee was identified as the injection site. The risk and benefits of a cortisone injection were explained and the patient consented to the injection. Under sterile conditions, the knee was injected with a mixture of 40 mg of Kenalog and 4 mL of 1% Lidocaine without complication. A sterile bandage was applied.     Administrations This Visit       lidocaine 1 % injection 4 mL       Admin Date  11/28/2022 Action  Given Dose  4 mL Route  Intra-artICUlar Administered By  Diamond Cervantes RN              triamcinolone acetonide [facility]) injection 40 mg       Admin Date  11/28/2022 Action  Given Dose  40 mg Route  Intra-artICUlar Administered By  Diamond Cervantes RN                        ASSESSMENT  Left elbow medial epicondylitis, left knee osteoarthritis      PLAN  Today we discussed her left elbow and knee. Previous left knee cortisone injection in July provided long-lasting pain relief up until 1 month ago. She reports symptoms have been gradually worsening and would like to repeat injection today. Cortisone injection to left knee was agreed to and provided today. She reports onset of elbow pain about a month or 2 ago without injury. On exam she does have point tenderness over her medial epicondyle consistent with golfers elbow. She has full range of motion elbow stable to valgus and varus stress. She was provided home exercises she can perform independently. Patient will obtain a counterforce strap OTC to wear with daily activities. She will also apply topical Voltaren gel as needed for symptom relief. Patient was agreeable plan of care today.   She will call to schedule follow-up appointment if symptoms persist.        BONI Garcia-CNP  Orthopedic Surgery   11/28/22  2:55 PM

## 2023-02-13 PROBLEM — G25.81 RESTLESS LEGS SYNDROME: Status: ACTIVE | Noted: 2023-02-13

## 2023-02-13 PROBLEM — F32.1 DEPRESSION, MAJOR, SINGLE EPISODE, MODERATE (MULTI): Status: ACTIVE | Noted: 2023-02-13

## 2023-02-13 PROBLEM — M81.0 OSTEOPOROSIS: Status: ACTIVE | Noted: 2023-02-13

## 2023-02-13 PROBLEM — E53.8 VITAMIN B12 DEFICIENCY: Status: ACTIVE | Noted: 2023-02-13

## 2023-02-13 PROBLEM — R53.82 CHRONIC FATIGUE: Status: ACTIVE | Noted: 2023-02-13

## 2023-02-13 RX ORDER — BUPROPION HYDROCHLORIDE 150 MG/1
1 TABLET, EXTENDED RELEASE ORAL EVERY 12 HOURS
COMMUNITY
Start: 2022-02-01 | End: 2023-04-25 | Stop reason: SDUPTHER

## 2023-02-13 RX ORDER — GABAPENTIN 300 MG/1
1 CAPSULE ORAL NIGHTLY
COMMUNITY
Start: 2022-02-01 | End: 2023-04-25 | Stop reason: SDUPTHER

## 2023-02-13 RX ORDER — DULOXETIN HYDROCHLORIDE 60 MG/1
1 CAPSULE, DELAYED RELEASE ORAL 2 TIMES DAILY
COMMUNITY
Start: 2022-01-31 | End: 2023-04-25 | Stop reason: SDUPTHER

## 2023-02-13 RX ORDER — ALENDRONATE SODIUM 70 MG/1
1 TABLET ORAL
COMMUNITY
Start: 2022-07-13 | End: 2023-04-25 | Stop reason: SDUPTHER

## 2023-04-17 ENCOUNTER — OFFICE VISIT (OUTPATIENT)
Dept: PRIMARY CARE | Facility: CLINIC | Age: 68
End: 2023-04-17
Payer: MEDICARE

## 2023-04-17 VITALS — DIASTOLIC BLOOD PRESSURE: 67 MMHG | HEART RATE: 76 BPM | SYSTOLIC BLOOD PRESSURE: 122 MMHG

## 2023-04-17 DIAGNOSIS — M81.0 OSTEOPOROSIS, UNSPECIFIED OSTEOPOROSIS TYPE, UNSPECIFIED PATHOLOGICAL FRACTURE PRESENCE: ICD-10-CM

## 2023-04-17 DIAGNOSIS — F32.1 DEPRESSION, MAJOR, SINGLE EPISODE, MODERATE (MULTI): Primary | ICD-10-CM

## 2023-04-17 DIAGNOSIS — G25.81 RESTLESS LEGS SYNDROME: ICD-10-CM

## 2023-04-17 PROCEDURE — 99214 OFFICE O/P EST MOD 30 MIN: CPT | Performed by: FAMILY MEDICINE

## 2023-04-17 NOTE — PROGRESS NOTES
Subjective   Patient ID: Shaina Fermin is a 68 y.o. female who presents for No chief complaint on file..    HPI   pt denies depressed mood. No agitation or seizures. Pt felt functional  at daily activities.  No heart burn while on fosamax.  RLS was controlled with neuronin     Review of Systems    Objective   /67   Pulse 76     Physical Exam  NAD, No sclera icterus. Normal pupil size, Lungs: CTA bilaterally, heart: RRR, abdomen: soft, no tenderness, BS+. Good balance, no jaundice, good mood and normal affect      Assessment/Plan   Problem List Items Addressed This Visit          Nervous    Restless legs syndrome     Controlled. Cont neurontin            Musculoskeletal    Osteoporosis     On fosamax. No SE. Cont the same.            Other    Depression, major, single episode, moderate (CMS/HCC) - Primary     Resolved with current meds. Cont the same

## 2023-04-25 ENCOUNTER — TELEPHONE (OUTPATIENT)
Dept: PRIMARY CARE | Facility: CLINIC | Age: 68
End: 2023-04-25
Payer: MEDICARE

## 2023-04-25 DIAGNOSIS — F32.1 DEPRESSION, MAJOR, SINGLE EPISODE, MODERATE (MULTI): ICD-10-CM

## 2023-04-25 DIAGNOSIS — Z00.00 ROUTINE GENERAL MEDICAL EXAMINATION AT A HEALTH CARE FACILITY: ICD-10-CM

## 2023-04-25 DIAGNOSIS — M81.0 OSTEOPOROSIS, UNSPECIFIED OSTEOPOROSIS TYPE, UNSPECIFIED PATHOLOGICAL FRACTURE PRESENCE: ICD-10-CM

## 2023-04-25 DIAGNOSIS — G25.81 RESTLESS LEGS SYNDROME: Primary | ICD-10-CM

## 2023-04-25 RX ORDER — DULOXETIN HYDROCHLORIDE 60 MG/1
60 CAPSULE, DELAYED RELEASE ORAL 2 TIMES DAILY
Qty: 180 CAPSULE | Refills: 3 | Status: SHIPPED | OUTPATIENT
Start: 2023-04-25 | End: 2023-08-09 | Stop reason: SDUPTHER

## 2023-04-25 RX ORDER — BUPROPION HYDROCHLORIDE 150 MG/1
150 TABLET, EXTENDED RELEASE ORAL EVERY 12 HOURS
Qty: 180 TABLET | Refills: 3 | Status: SHIPPED | OUTPATIENT
Start: 2023-04-25 | End: 2023-10-16 | Stop reason: SDUPTHER

## 2023-04-25 RX ORDER — GABAPENTIN 300 MG/1
300 CAPSULE ORAL NIGHTLY
Qty: 90 CAPSULE | Refills: 3 | Status: SHIPPED | OUTPATIENT
Start: 2023-04-25 | End: 2023-08-09 | Stop reason: SDUPTHER

## 2023-04-25 RX ORDER — ALENDRONATE SODIUM 70 MG/1
70 TABLET ORAL
Qty: 12 TABLET | Refills: 3 | Status: SHIPPED | OUTPATIENT
Start: 2023-04-25 | End: 2023-08-09 | Stop reason: SDUPTHER

## 2023-04-25 NOTE — TELEPHONE ENCOUNTER
Patient needs all of her meds refilled. [facility] in [place] is her pharmacy. Second she needs to prove that she has had immunizations for a job can we order blood tests for that? Third she needs at [employer] test ordered for employment.

## 2023-05-01 ENCOUNTER — LAB (OUTPATIENT)
Dept: LAB | Facility: LAB | Age: 68
End: 2023-05-01
Payer: MEDICARE

## 2023-05-01 DIAGNOSIS — Z00.00 ROUTINE GENERAL MEDICAL EXAMINATION AT A HEALTH CARE FACILITY: ICD-10-CM

## 2023-05-01 PROCEDURE — 86735 MUMPS ANTIBODY: CPT

## 2023-05-01 PROCEDURE — 86615 BORDETELLA ANTIBODY: CPT

## 2023-05-01 PROCEDURE — 36415 COLL VENOUS BLD VENIPUNCTURE: CPT

## 2023-05-01 PROCEDURE — 86317 IMMUNOASSAY INFECTIOUS AGENT: CPT

## 2023-05-01 PROCEDURE — 86774 TETANUS ANTIBODY: CPT

## 2023-05-01 PROCEDURE — 86765 RUBEOLA ANTIBODY: CPT

## 2023-05-01 PROCEDURE — 86762 RUBELLA ANTIBODY: CPT

## 2023-05-02 LAB
MUMPS IGG ANTIBODY: POSITIVE
RUBELLA VIRUS IGG AB: POSITIVE
RUBEOLA IGG ANTIBODY: POSITIVE

## 2023-05-04 LAB
BORDETELLA PERTUSSIS IGG ANTIBODY: 2.19 INDEX (ref 0–0.94)
DIPHTHERIA ANTIBODY: 5.5 IU/ML
NIL(NEG) CONTROL SPOT COUNT: NORMAL
PANEL A SPOT COUNT: 0
PANEL B SPOT COUNT: 0
POS CONTROL SPOT COUNT: NORMAL
T-SPOT. TB INTERPRETATION: NEGATIVE
TETANUS AB IGG: 6.2 IU/ML

## 2023-08-07 DIAGNOSIS — G25.81 RESTLESS LEGS SYNDROME: ICD-10-CM

## 2023-08-07 DIAGNOSIS — F32.1 DEPRESSION, MAJOR, SINGLE EPISODE, MODERATE (MULTI): ICD-10-CM

## 2023-08-07 DIAGNOSIS — M81.0 OSTEOPOROSIS, UNSPECIFIED OSTEOPOROSIS TYPE, UNSPECIFIED PATHOLOGICAL FRACTURE PRESENCE: ICD-10-CM

## 2023-08-09 RX ORDER — ALENDRONATE SODIUM 70 MG/1
70 TABLET ORAL
Qty: 13 TABLET | Refills: 3 | Status: SHIPPED | OUTPATIENT
Start: 2023-08-09 | End: 2023-10-16 | Stop reason: SDUPTHER

## 2023-08-09 RX ORDER — GABAPENTIN 300 MG/1
300 CAPSULE ORAL NIGHTLY
Qty: 90 CAPSULE | Refills: 3 | Status: SHIPPED | OUTPATIENT
Start: 2023-08-09 | End: 2023-10-16 | Stop reason: SDUPTHER

## 2023-08-09 RX ORDER — DULOXETIN HYDROCHLORIDE 60 MG/1
60 CAPSULE, DELAYED RELEASE ORAL 2 TIMES DAILY
Qty: 180 CAPSULE | Refills: 3 | Status: SHIPPED | OUTPATIENT
Start: 2023-08-09 | End: 2023-10-16 | Stop reason: SDUPTHER

## 2023-10-16 ENCOUNTER — OFFICE VISIT (OUTPATIENT)
Dept: PRIMARY CARE | Facility: CLINIC | Age: 68
End: 2023-10-16
Payer: MEDICARE

## 2023-10-16 VITALS
BODY MASS INDEX: 26.52 KG/M2 | RESPIRATION RATE: 14 BRPM | WEIGHT: 165 LBS | HEART RATE: 76 BPM | HEIGHT: 66 IN | SYSTOLIC BLOOD PRESSURE: 127 MMHG | DIASTOLIC BLOOD PRESSURE: 73 MMHG

## 2023-10-16 DIAGNOSIS — Z12.31 BREAST CANCER SCREENING BY MAMMOGRAM: ICD-10-CM

## 2023-10-16 DIAGNOSIS — F32.1 DEPRESSION, MAJOR, SINGLE EPISODE, MODERATE (MULTI): ICD-10-CM

## 2023-10-16 DIAGNOSIS — M81.0 OSTEOPOROSIS, UNSPECIFIED OSTEOPOROSIS TYPE, UNSPECIFIED PATHOLOGICAL FRACTURE PRESENCE: ICD-10-CM

## 2023-10-16 DIAGNOSIS — G25.81 RESTLESS LEGS SYNDROME: ICD-10-CM

## 2023-10-16 DIAGNOSIS — Z00.00 ROUTINE MEDICAL EXAM: Primary | ICD-10-CM

## 2023-10-16 PROCEDURE — 99214 OFFICE O/P EST MOD 30 MIN: CPT | Performed by: FAMILY MEDICINE

## 2023-10-16 PROCEDURE — 1170F FXNL STATUS ASSESSED: CPT | Performed by: FAMILY MEDICINE

## 2023-10-16 PROCEDURE — 1159F MED LIST DOCD IN RCRD: CPT | Performed by: FAMILY MEDICINE

## 2023-10-16 PROCEDURE — 1160F RVW MEDS BY RX/DR IN RCRD: CPT | Performed by: FAMILY MEDICINE

## 2023-10-16 PROCEDURE — 1036F TOBACCO NON-USER: CPT | Performed by: FAMILY MEDICINE

## 2023-10-16 PROCEDURE — G0438 PPPS, INITIAL VISIT: HCPCS | Performed by: FAMILY MEDICINE

## 2023-10-16 RX ORDER — DULOXETIN HYDROCHLORIDE 60 MG/1
60 CAPSULE, DELAYED RELEASE ORAL 2 TIMES DAILY
Qty: 180 CAPSULE | Refills: 3 | Status: SHIPPED | OUTPATIENT
Start: 2023-10-16 | End: 2024-04-15 | Stop reason: SDUPTHER

## 2023-10-16 RX ORDER — GABAPENTIN 300 MG/1
300 CAPSULE ORAL NIGHTLY
Qty: 90 CAPSULE | Refills: 3 | Status: SHIPPED | OUTPATIENT
Start: 2023-10-16 | End: 2024-04-15 | Stop reason: SDUPTHER

## 2023-10-16 RX ORDER — ALENDRONATE SODIUM 70 MG/1
70 TABLET ORAL
Qty: 13 TABLET | Refills: 3 | Status: SHIPPED | OUTPATIENT
Start: 2023-10-16

## 2023-10-16 RX ORDER — BUPROPION HYDROCHLORIDE 150 MG/1
150 TABLET, EXTENDED RELEASE ORAL EVERY 12 HOURS
Qty: 180 TABLET | Refills: 3 | Status: SHIPPED | OUTPATIENT
Start: 2023-10-16 | End: 2024-04-15 | Stop reason: SDUPTHER

## 2023-10-16 ASSESSMENT — ACTIVITIES OF DAILY LIVING (ADL)
MANAGING_FINANCES: INDEPENDENT
GROCERY_SHOPPING: INDEPENDENT
DRESSING: INDEPENDENT
DOING_HOUSEWORK: INDEPENDENT
TAKING_MEDICATION: INDEPENDENT
BATHING: INDEPENDENT

## 2023-10-16 ASSESSMENT — PATIENT HEALTH QUESTIONNAIRE - PHQ9
1. LITTLE INTEREST OR PLEASURE IN DOING THINGS: NOT AT ALL
SUM OF ALL RESPONSES TO PHQ9 QUESTIONS 1 AND 2: 0
2. FEELING DOWN, DEPRESSED OR HOPELESS: NOT AT ALL

## 2023-10-16 NOTE — PROGRESS NOTES
"Subjective   Patient ID: Shaina Fermin is a 68 y.o. female who presents for Medicare Annual Wellness Visit Initial (fu).    HPI   No concerns.  Review of Systems    Objective   /73   Pulse 76   Resp 14   Ht 1.664 m (5' 5.5\")   Wt 74.8 kg (165 lb)   BMI 27.04 kg/m²     Physical Exam  NAD, well groomed, No sclera icterus. neck: supple, no cervical or axillary lymphadenopathy,  lungs: CTA b/l, heart: RRR, No LE edema, normal pedal pulses, abd: soft, no tenderness, BS+, normal strength and sensation  at bilateral lower extremities. Good balance. CNII-XII were grossly intact, good judgment and memory. No depressed mood.    Assessment/Plan   Problem List Items Addressed This Visit             ICD-10-CM    Depression, major, single episode, moderate (CMS/HCC) F32.1     Depression, well controlled. No HI/SI. Cont. the same.          Relevant Medications    buPROPion SR (Wellbutrin SR) 150 mg 12 hr tablet    DULoxetine (Cymbalta) 60 mg DR capsule    Osteoporosis M81.0     On fosamax. No GI upset. Cont the same         Relevant Medications    alendronate (Fosamax) 70 mg tablet    Restless legs syndrome G25.81     Controlled. No confusion of falls. Cont the same         Relevant Medications    gabapentin (Neurontin) 300 mg capsule     Other Visit Diagnoses         Codes    Routine medical exam    -  Primary Z00.00    Relevant Orders    Comprehensive Metabolic Panel    Lipid Panel    Breast cancer screening by mammogram     Z12.31    Relevant Orders    BI mammo bilateral screening tomosynthesis             Medicare wellness visit: pt was capable of performing all his ADLs and IADLs. Pt has good memory and cognitive function. pt is overweight,  Recommend healthy diet and regular exercise. Advise eye exam by an OD yearly for glaucoma screen and dental exam every 6 months. check lipids.   will monitor blood pressure, cholesterol levels and weight regularly. Recommend shingle vaccines.     Recommend pt to clear throw " rugs at home and keep good lighting at night to avoid falls.    recommend to update living will and DPOA  pt stated that he had been taking all current  medications as prescribed.

## 2023-11-13 ENCOUNTER — LAB (OUTPATIENT)
Dept: LAB | Facility: LAB | Age: 68
End: 2023-11-13
Payer: MEDICARE

## 2023-11-13 ENCOUNTER — HOSPITAL ENCOUNTER (OUTPATIENT)
Dept: RADIOLOGY | Facility: HOSPITAL | Age: 68
Discharge: HOME | End: 2023-11-13
Payer: MEDICARE

## 2023-11-13 DIAGNOSIS — Z12.31 BREAST CANCER SCREENING BY MAMMOGRAM: ICD-10-CM

## 2023-11-13 DIAGNOSIS — Z00.00 ROUTINE MEDICAL EXAM: ICD-10-CM

## 2023-11-13 LAB
ALBUMIN SERPL BCP-MCNC: 4.3 G/DL (ref 3.4–5)
ALP SERPL-CCNC: 96 U/L (ref 33–136)
ALT SERPL W P-5'-P-CCNC: 15 U/L (ref 7–45)
ANION GAP SERPL CALC-SCNC: 12 MMOL/L (ref 10–20)
AST SERPL W P-5'-P-CCNC: 23 U/L (ref 9–39)
BILIRUB SERPL-MCNC: 0.4 MG/DL (ref 0–1.2)
BUN SERPL-MCNC: 16 MG/DL (ref 6–23)
CALCIUM SERPL-MCNC: 9.3 MG/DL (ref 8.6–10.3)
CHLORIDE SERPL-SCNC: 103 MMOL/L (ref 98–107)
CHOLEST SERPL-MCNC: 186 MG/DL (ref 0–199)
CHOLESTEROL/HDL RATIO: 2.3
CO2 SERPL-SCNC: 30 MMOL/L (ref 21–32)
CREAT SERPL-MCNC: 1.01 MG/DL (ref 0.5–1.05)
GFR SERPL CREATININE-BSD FRML MDRD: 61 ML/MIN/1.73M*2
GLUCOSE SERPL-MCNC: 100 MG/DL (ref 74–99)
HDLC SERPL-MCNC: 81 MG/DL
LDLC SERPL CALC-MCNC: 91 MG/DL
NON HDL CHOLESTEROL: 105 MG/DL (ref 0–149)
POTASSIUM SERPL-SCNC: 4.5 MMOL/L (ref 3.5–5.3)
PROT SERPL-MCNC: 6.7 G/DL (ref 6.4–8.2)
SODIUM SERPL-SCNC: 140 MMOL/L (ref 136–145)
TRIGL SERPL-MCNC: 70 MG/DL (ref 0–149)
VLDL: 14 MG/DL (ref 0–40)

## 2023-11-13 PROCEDURE — 36415 COLL VENOUS BLD VENIPUNCTURE: CPT

## 2023-11-13 PROCEDURE — 77067 SCR MAMMO BI INCL CAD: CPT

## 2023-11-13 PROCEDURE — 77067 SCR MAMMO BI INCL CAD: CPT | Performed by: RADIOLOGY

## 2023-11-13 PROCEDURE — 80053 COMPREHEN METABOLIC PANEL: CPT

## 2023-11-13 PROCEDURE — 80061 LIPID PANEL: CPT

## 2023-11-13 PROCEDURE — 77063 BREAST TOMOSYNTHESIS BI: CPT | Performed by: RADIOLOGY

## 2023-11-15 ENCOUNTER — PREP FOR PROCEDURE (OUTPATIENT)
Dept: SURGERY | Age: 68
End: 2023-11-15

## 2023-11-15 RX ORDER — SODIUM CHLORIDE 9 MG/ML
INJECTION, SOLUTION INTRAVENOUS CONTINUOUS
Status: CANCELLED | OUTPATIENT
Start: 2023-11-15

## 2023-11-15 RX ORDER — SODIUM CHLORIDE 0.9 % (FLUSH) 0.9 %
5-40 SYRINGE (ML) INJECTION PRN
Status: CANCELLED | OUTPATIENT
Start: 2023-11-15

## 2023-11-15 RX ORDER — SODIUM CHLORIDE 9 MG/ML
25 INJECTION, SOLUTION INTRAVENOUS PRN
Status: CANCELLED | OUTPATIENT
Start: 2023-11-15

## 2023-11-15 RX ORDER — SODIUM CHLORIDE 0.9 % (FLUSH) 0.9 %
5-40 SYRINGE (ML) INJECTION EVERY 12 HOURS SCHEDULED
Status: CANCELLED | OUTPATIENT
Start: 2023-11-15

## 2023-11-17 ENCOUNTER — HOSPITAL ENCOUNTER (OUTPATIENT)
Age: 68
Setting detail: OUTPATIENT SURGERY
Discharge: HOME | End: 2023-11-17
Attending: SURGERY | Admitting: SURGERY
Payer: MEDICARE

## 2023-11-17 VITALS
TEMPERATURE: 97.1 F | OXYGEN SATURATION: 98 % | WEIGHT: 165 LBS | HEIGHT: 67 IN | HEART RATE: 80 BPM | BODY MASS INDEX: 25.9 KG/M2 | RESPIRATION RATE: 18 BRPM | SYSTOLIC BLOOD PRESSURE: 142 MMHG | DIASTOLIC BLOOD PRESSURE: 91 MMHG

## 2023-11-17 DIAGNOSIS — K21.9 GASTROESOPHAGEAL REFLUX DISEASE, UNSPECIFIED WHETHER ESOPHAGITIS PRESENT: ICD-10-CM

## 2023-11-17 PROCEDURE — 7100000010 HC PHASE II RECOVERY - FIRST 15 MIN: Performed by: SURGERY

## 2023-11-17 PROCEDURE — 88342 IMHCHEM/IMCYTCHM 1ST ANTB: CPT

## 2023-11-17 PROCEDURE — 6360000002 HC RX W HCPCS: Performed by: SURGERY

## 2023-11-17 PROCEDURE — 7100000011 HC PHASE II RECOVERY - ADDTL 15 MIN: Performed by: SURGERY

## 2023-11-17 PROCEDURE — 99153 MOD SED SAME PHYS/QHP EA: CPT | Performed by: SURGERY

## 2023-11-17 PROCEDURE — 2709999900 HC NON-CHARGEABLE SUPPLY: Performed by: SURGERY

## 2023-11-17 PROCEDURE — 88305 TISSUE EXAM BY PATHOLOGIST: CPT

## 2023-11-17 PROCEDURE — 3609012400 HC EGD TRANSORAL BIOPSY SINGLE/MULTIPLE: Performed by: SURGERY

## 2023-11-17 PROCEDURE — 99152 MOD SED SAME PHYS/QHP 5/>YRS: CPT | Performed by: SURGERY

## 2023-11-17 RX ORDER — SUCRALFATE 1 G/1
1 TABLET ORAL DAILY
Qty: 30 TABLET | Refills: 2 | Status: SHIPPED | OUTPATIENT
Start: 2023-11-17

## 2023-11-17 RX ORDER — MEPERIDINE HYDROCHLORIDE 50 MG/ML
INJECTION INTRAMUSCULAR; INTRAVENOUS; SUBCUTANEOUS PRN
Status: DISCONTINUED | OUTPATIENT
Start: 2023-11-17 | End: 2023-11-17 | Stop reason: ALTCHOICE

## 2023-11-17 RX ORDER — SODIUM CHLORIDE 0.9 % (FLUSH) 0.9 %
5-40 SYRINGE (ML) INJECTION PRN
Status: DISCONTINUED | OUTPATIENT
Start: 2023-11-17 | End: 2023-11-17 | Stop reason: HOSPADM

## 2023-11-17 RX ORDER — MIDAZOLAM HYDROCHLORIDE 1 MG/ML
INJECTION INTRAMUSCULAR; INTRAVENOUS PRN
Status: DISCONTINUED | OUTPATIENT
Start: 2023-11-17 | End: 2023-11-17 | Stop reason: ALTCHOICE

## 2023-11-17 RX ORDER — SODIUM CHLORIDE 9 MG/ML
25 INJECTION, SOLUTION INTRAVENOUS PRN
Status: DISCONTINUED | OUTPATIENT
Start: 2023-11-17 | End: 2023-11-17 | Stop reason: HOSPADM

## 2023-11-17 RX ORDER — SODIUM CHLORIDE 9 MG/ML
INJECTION, SOLUTION INTRAVENOUS CONTINUOUS
Status: DISCONTINUED | OUTPATIENT
Start: 2023-11-17 | End: 2023-11-17 | Stop reason: HOSPADM

## 2023-11-17 RX ORDER — SODIUM CHLORIDE 0.9 % (FLUSH) 0.9 %
5-40 SYRINGE (ML) INJECTION EVERY 12 HOURS SCHEDULED
Status: DISCONTINUED | OUTPATIENT
Start: 2023-11-17 | End: 2023-11-17 | Stop reason: HOSPADM

## 2023-11-17 ASSESSMENT — PAIN - FUNCTIONAL ASSESSMENT: PAIN_FUNCTIONAL_ASSESSMENT: 0-10

## 2023-11-17 NOTE — PRE SEDATION
Sedation Pre-Procedure Note    Patient Name: Claudia Copeland   YOB: 1955  Room/Bed: [facility] POOL ROOM/NONE  Medical Record Number: [id number]  Date: 11/17/2023   Time: 10:28 AM       Indication:  reflux    Consent: I have discussed with the patient and/or the patient representative the indication, alternatives, and the possible risks and/or complications of the planned procedure and the anesthesia methods. The patient and/or patient representative appear to understand and agree to proceed. Vital Signs:   Vitals:    11/17/23 1028   BP: 110/70   Pulse: 81   Resp:    Temp:    SpO2: 96%       Past Medical History:   has a past medical history of Depression, GERD (gastroesophageal reflux disease), Irregular heart beat, and Restless leg syndrome. Past Surgical History:   has a past surgical history that includes Upper gastrointestinal endoscopy (12/22/11); cardiovascular stress test (3/8/16); Colonoscopy; pr egd transoral biopsy single/multiple (6/27/2018); Colonoscopy (6/27/2018); Upper gastrointestinal endoscopy (N/A, 1/13/2020); and Colonoscopy (N/A, 1/13/2020). Medications:   Scheduled Meds:    sodium chloride flush  5-40 mL IntraVENous 2 times per day     Continuous Infusions:    sodium chloride      sodium chloride       PRN Meds: sodium chloride flush, sodium chloride, meperidine, midazolam  Home Meds:   Prior to Admission medications    Medication Sig Start Date End Date Taking? Authorizing Provider   alendronate (FOSAMAX) 70 MG tablet Take 1 tablet by mouth every 7 days    Lenora Chamberlain MD   gabapentin (NEURONTIN) 300 MG capsule Take 1 capsule by mouth nightly. ProviderLenora MD   buPROPion (WELLBUTRIN XL) 150 MG XL tablet Take 1 tablet by mouth in the morning and 1 tablet in the evening.   .    Lenora Chamberlain MD   duloxetine (CYMBALTA) 60 MG capsule Take 1 capsule by mouth in the morning and at bedtime    Lenora Chamberlain MD     Coumadin Use Last 7 Days:

## 2023-11-17 NOTE — BRIEF OP NOTE
Brief Postoperative Note      Patient: Josselin Rogers  YOB: 1955  MRN: [id number]    Date of Procedure: 11/17/2023    Pre-Op Diagnosis Codes:     * Gastroesophageal reflux disease, unspecified whether esophagitis present [K21.9]    Post-Op Diagnosis: Same       Procedure(s):  EGD BIOPSY    Surgeon(s):  Daquan Quinn MD    Assistant:  * No surgical staff found *    Anesthesia: IV Sedation    Estimated Blood Loss (mL): Minimal    Complications: None    Specimens:   ID Type Source Tests Collected by Time Destination   A : Antral Bx R/O Hpylori Tissue Stomach SURGICAL PATHOLOGY Daquan Quinn MD 11/17/2023 1017        Implants:  * No implants in log *      Drains: * No LDAs found *    Findings: antral gastritis       Electronically signed by Daquan Quinn MD on 11/17/2023 at 10:30 AM

## 2023-11-17 NOTE — DISCHARGE INSTR - COC
Continuity of Care Form    Patient Name: Fide Verduzco   :  1955  MRN:  [id number]    Admit date:  2023  Discharge date:  ***    Code Status Order: Full Code   Advance Directives:   Advance Care Flowsheet Documentation       Date/Time Healthcare Directive Type of Healthcare Directive Copy in [address] Agent's Name Healthcare Agent's Phone Number    23 0914 No, patient does not have an advance directive for healthcare treatment -- -- -- -- --            Admitting Physician:  Akira Van MD  PCP: Marianna Castellanos MD    Discharging Nurse: [facility] Unit/Room#: [address] [place] [address]  Discharging Unit Phone Number: ***    Emergency Contact:   Extended Emergency Contact Information  Primary Emergency Contact: Gerber Kee  Address: [address] [place] [address], [address] [place] [address] [place] Phone: [phone]  Mobile Phone: [phone]  Relation: Spouse    Past Surgical History:  Past Surgical History:   Procedure Laterality Date    CARDIOVASCULAR STRESS TEST  3/8/16    COLONOSCOPY      COLONOSCOPY  2018    COLONOSCOPY WITH BIOPSY performed by Akira Van MD at [address] [facility] N/A 2020    COLONOSCOPY WITH BIOPSY performed by Akira Van MD at [address] [facility] EGD TRANSORAL BIOPSY SINGLE/MULTIPLE  2018    EGD BIOPSY performed by Akira Van MD at [address] [facility]  11    antral ulcers    UPPER GASTROINTESTINAL ENDOSCOPY N/A 2020    EGD BIOPSY performed by Akira Van MD at [address] [facility]       Immunization History:   Immunization History   Administered Date(s) Administered    COVID-19, MODERNA BLUE border, Primary or Immunocompromised, (age 12y+), IM, 100 mcg/0.5mL 2021, 2021, 11/10/2021, 2022       Active Problems:  Patient Active Problem List   Diagnosis Code    Atypical chest pain R07.89 ***  Oxygen Therapy:  {Therapy; copd oxygen:93758}  Ventilator:    {MH CC Vent YMLD:368206535}    Rehab Therapies: {THERAPEUTIC INTERVENTION:2470761167}  Weight Bearing Status/Restrictions: [address] CC Weight Bearin}  Other Medical Equipment (for information only, NOT a DME order):  {EQUIPMENT:789846609}  Other Treatments: ***    Patient's personal belongings (please select all that are sent with patient):  {P DME Belongings:775214737}    RN SIGNATURE:  {Esignature:136738924}    CASE MANAGEMENT/SOCIAL WORK SECTION    Inpatient Status Date: ***    Readmission Risk Assessment Score:  Readmission Risk              Risk of Unplanned Readmission:  0           Discharging to Facility/ Agency   Name:   Address:  Phone:  Fax:    Dialysis Facility (if applicable)   Name:  Address:  Dialysis Schedule:  Phone:  Fax:    / signature: {Esignature:195050645}    PHYSICIAN SECTION    Prognosis: {Prognosis:9719706845}    Condition at Discharge: [address] Patient Condition:123624458}    Rehab Potential (if transferring to Rehab): {Prognosis:7480012505}    Recommended Labs or Other Treatments After Discharge: ***    Physician Certification: I certify the above information and transfer of [place]  is necessary for the continuing treatment of the diagnosis listed and that she requires {Admit to Appropriate Level of Care:34321} for {GREATER/LESS:277556585} 30 days.      Update Admission H&P: {CHP DME Changes in OWMZF:658826932}    PHYSICIAN SIGNATURE:  {Esignature:415196317}

## 2023-11-17 NOTE — DISCHARGE INSTR - DIET
Chronic pain syndrome Good nutrition is important when healing from an illness, injury, or surgery. Follow any nutrition recommendations given to you during your hospital stay. If you were given an oral nutrition supplement while in the hospital, continue to take this supplement at home. You can take it with meals, in-between meals, and/or before bedtime. These supplements can be purchased at most local grocery stores, pharmacies, and chain [employer]-stores. If you have any questions about your diet or nutrition, call the hospital and ask for the dietitian.

## 2023-11-18 NOTE — PROCEDURES
[address], [facility] [address]                                 PROCEDURE NOTE    PATIENT NAME: Luciano Franklin                   :        1955  MED REC NO:   [id number]                            ROOM:  ACCOUNT NO:   [de-identified]                           ADMIT DATE: 2023  PROVIDER:     Olman Abraham MD    DATE OF PROCEDURE:  2023    PREPROCEDURE DIAGNOSIS:  Reflux. POSTPROCEDURE DIAGNOSIS:  Gastritis of the antrum and no other mucosal  pathology. PROCEDURES PERFORMED:  Consisted of an upper endoscopy and antral  biopsies. SURGEON:  Olman Abraham MD    BLOOD LOSS:  Negligible. DESCRIPTION OF PROCEDURE:  Procedure was carried out under awake  sedation. The patient in left supine position, oropharyngeal area was  anesthetized with Cetacaine spray. The endoscope was introduced and  progressed without difficulty through the three portion of the  esophagus. GE junction was patent, fairly competent. Within the  stomach, there were inflammatory changes in the antral region. Biopsies  were taken with good hemostasis. Endoscope was advanced through the  pyloric ring and first, second, and third portion of the duodenum. There was bile leak in the duodenal area and no mucosal pathology. Endoscope was pulled back confirming adequate hemostasis, retroflex  confirming adequate GE junction incompetency. Endoscope was pulled back  into the oropharyngeal area revealing good functional vocal cords. The  patient tolerated the procedure well. Appropriate recommendations were  given. The patient will be followed up on an outpatient basis in my  office for further management as needed.         Olman Abraham MD    D: 2023 14:01:10       T: 2023 23:16:40     /[email or web address]  Job#: [id number]     Doc#: [id number]    CC:

## 2023-11-22 DIAGNOSIS — R92.8 ABNORMAL MAMMOGRAM OF RIGHT BREAST: Primary | ICD-10-CM

## 2023-11-24 LAB — SURGICAL PATHOLOGY REPORT: NORMAL

## 2023-12-11 ENCOUNTER — HOSPITAL ENCOUNTER (OUTPATIENT)
Dept: ULTRASOUND IMAGING | Age: 68
Discharge: HOME | End: 2023-12-13
Payer: MEDICARE

## 2023-12-11 DIAGNOSIS — K21.9 GASTROESOPHAGEAL REFLUX DISEASE, UNSPECIFIED WHETHER ESOPHAGITIS PRESENT: ICD-10-CM

## 2023-12-11 PROCEDURE — 76705 ECHO EXAM OF ABDOMEN: CPT

## 2023-12-21 ENCOUNTER — HOSPITAL ENCOUNTER (OUTPATIENT)
Dept: RADIOLOGY | Facility: HOSPITAL | Age: 68
Discharge: HOME | End: 2023-12-21
Payer: MEDICARE

## 2023-12-21 DIAGNOSIS — R92.8 ABNORMAL MAMMOGRAM OF RIGHT BREAST: ICD-10-CM

## 2023-12-21 PROCEDURE — 77065 DX MAMMO INCL CAD UNI: CPT | Mod: RT

## 2023-12-21 PROCEDURE — 76982 USE 1ST TARGET LESION: CPT | Mod: RT

## 2023-12-21 PROCEDURE — 76642 ULTRASOUND BREAST LIMITED: CPT | Mod: RIGHT SIDE | Performed by: RADIOLOGY

## 2023-12-21 PROCEDURE — G0279 TOMOSYNTHESIS, MAMMO: HCPCS | Mod: RIGHT SIDE | Performed by: RADIOLOGY

## 2023-12-21 PROCEDURE — 77065 DX MAMMO INCL CAD UNI: CPT | Mod: RIGHT SIDE | Performed by: RADIOLOGY

## 2023-12-21 PROCEDURE — 76642 ULTRASOUND BREAST LIMITED: CPT | Mod: RT

## 2023-12-22 DIAGNOSIS — R92.8 ABNORMAL MAMMOGRAM: Primary | ICD-10-CM

## 2024-04-15 ENCOUNTER — OFFICE VISIT (OUTPATIENT)
Dept: PRIMARY CARE | Facility: CLINIC | Age: 69
End: 2024-04-15
Payer: MEDICARE

## 2024-04-15 VITALS — SYSTOLIC BLOOD PRESSURE: 136 MMHG | DIASTOLIC BLOOD PRESSURE: 71 MMHG | HEART RATE: 72 BPM

## 2024-04-15 DIAGNOSIS — G25.81 RESTLESS LEGS SYNDROME: ICD-10-CM

## 2024-04-15 DIAGNOSIS — L98.9 CHEST SKIN LESION: Primary | ICD-10-CM

## 2024-04-15 DIAGNOSIS — F32.1 DEPRESSION, MAJOR, SINGLE EPISODE, MODERATE (MULTI): ICD-10-CM

## 2024-04-15 PROCEDURE — 99214 OFFICE O/P EST MOD 30 MIN: CPT | Performed by: FAMILY MEDICINE

## 2024-04-15 PROCEDURE — 1036F TOBACCO NON-USER: CPT | Performed by: FAMILY MEDICINE

## 2024-04-15 PROCEDURE — 88305 TISSUE EXAM BY PATHOLOGIST: CPT | Performed by: DERMATOLOGY

## 2024-04-15 PROCEDURE — 1124F ACP DISCUSS-NO DSCNMKR DOCD: CPT | Performed by: FAMILY MEDICINE

## 2024-04-15 PROCEDURE — 11104 PUNCH BX SKIN SINGLE LESION: CPT | Performed by: FAMILY MEDICINE

## 2024-04-15 RX ORDER — BUPROPION HYDROCHLORIDE 150 MG/1
150 TABLET, EXTENDED RELEASE ORAL EVERY 12 HOURS
Qty: 180 TABLET | Refills: 3 | Status: SHIPPED | OUTPATIENT
Start: 2024-04-15

## 2024-04-15 RX ORDER — DULOXETIN HYDROCHLORIDE 60 MG/1
60 CAPSULE, DELAYED RELEASE ORAL 2 TIMES DAILY
Qty: 180 CAPSULE | Refills: 3 | Status: SHIPPED | OUTPATIENT
Start: 2024-04-15

## 2024-04-15 RX ORDER — GABAPENTIN 300 MG/1
300 CAPSULE ORAL NIGHTLY
Qty: 90 CAPSULE | Refills: 3 | Status: SHIPPED | OUTPATIENT
Start: 2024-04-15

## 2024-04-15 NOTE — PROGRESS NOTES
Subjective   Patient ID: Shaina Fermin is a 69 y.o. female who presents for fu    HPI   pt denies depressed mood or fatigue while on cymbalta and wellbutrin. No seizure or HI/SI. Normal appetite with good sleep. No cp, heart palpitation or LE edema. No HA, dizziness. RLS was controlled with Neurontin. No confusion, memory loss or imbalance. pt has had a skin lesion at left upper chest wall for a few  years.  pt felt occasional tenderness at the mole. no bleeding or itchiness. pt was concerned about skin cancer and requested evaluation.    Review of Systems    Objective   /71   Pulse 72     Physical Exam  NAD, well groomed, No sclera icterus. neck: supple, no cervical or axillary lymphadenopathy,   lungs: CTA b/l, heart: RRR, no LE edema, abd: soft, no tenderness, BS+,   there was a 0.4x0.3 cm skin lesion at left upper chest wall with irregular border, no tenderness or bleeding, Good balance. No depressed mood  Assessment/Plan   Problem List Items Addressed This Visit             ICD-10-CM    Depression, major, single episode, moderate (Multi) F32.1     Depression, well controlled. Cont the same, recommend to cut down cymbalta as tolerated. f/u in 6 mos           Relevant Medications    buPROPion SR (Wellbutrin SR) 150 mg 12 hr tablet    DULoxetine (Cymbalta) 60 mg DR capsule    Restless legs syndrome G25.81     Controlled. No SE from neurontin. Cont the same         Relevant Medications    gabapentin (Neurontin) 300 mg capsule    Chest skin lesion - Primary L98.9     Recommend biopsy for evaluation. Pt agreed to the procedure. Under sterile condition with betadine and 1 ml 1% lidocaine anesthesia, the lesion  was biopsied  and sent to pathology. The wound was covered with sterile gauze. Advise pt to keep area clean and dry. We will notify the pt of the pathology result of the skin biopsy.           Relevant Orders    Dermatopathology- DERM LAB

## 2024-04-15 NOTE — ASSESSMENT & PLAN NOTE
Depression, well controlled. Cont the same, recommend to cut down cymbalta as tolerated. f/u in 6 mos

## 2024-04-15 NOTE — ASSESSMENT & PLAN NOTE
Recommend biopsy for evaluation. Pt agreed to the procedure. Under sterile condition with betadine and 1 ml 1% lidocaine anesthesia, the lesion  was biopsied  and sent to pathology. The wound was covered with sterile gauze. Advise pt to keep area clean and dry. We will notify the pt of the pathology result of the skin biopsy.

## 2024-04-17 LAB
LABORATORY COMMENT REPORT: NORMAL
PATH REPORT.FINAL DX SPEC: NORMAL
PATH REPORT.GROSS SPEC: NORMAL
PATH REPORT.MICROSCOPIC SPEC OTHER STN: NORMAL
PATH REPORT.RELEVANT HX SPEC: NORMAL
PATH REPORT.TOTAL CANCER: NORMAL

## 2024-04-19 ENCOUNTER — OFFICE VISIT (OUTPATIENT)
Dept: PRIMARY CARE | Facility: CLINIC | Age: 69
End: 2024-04-19
Payer: MEDICARE

## 2024-04-19 DIAGNOSIS — D04.5 SQUAMOUS CELL CARCINOMA IN SITU OF SKIN OF CHEST: Primary | ICD-10-CM

## 2024-04-19 PROCEDURE — 11602 EXC TR-EXT MAL+MARG 1.1-2 CM: CPT | Performed by: FAMILY MEDICINE

## 2024-04-19 PROCEDURE — 99213 OFFICE O/P EST LOW 20 MIN: CPT | Performed by: FAMILY MEDICINE

## 2024-04-19 PROCEDURE — 88305 TISSUE EXAM BY PATHOLOGIST: CPT | Performed by: DERMATOLOGY

## 2024-04-19 RX ORDER — CEPHALEXIN 500 MG/1
500 CAPSULE ORAL 3 TIMES DAILY
Qty: 21 CAPSULE | Refills: 0 | Status: SHIPPED | OUTPATIENT
Start: 2024-04-19 | End: 2024-04-26

## 2024-04-19 NOTE — PROGRESS NOTES
Subjective   Patient ID: Shaina Fermin is a 69 y.o. female who presents for skin ca fu    HPI   Pathology of chest wall bx showed scc in situ. No cough or local lymph node enlargement. No wt loss  Review of Systems    Objective   There were no vitals taken for this visit.    Physical Exam  No acute distress. no cervical or axillary lymphadenopathy,  Lungs: CTA b/l, Heart: RRR, Abdomen: soft, non tenderness. Bowel sound: normal. there was a 0.4x0.5 cm skin lesion at left upper chest wall, no tenderness or bleeding, Patient walks with a good balance.     Assessment/Plan     Chest wall scc in situ,  recommend excision. Under sterile condition and with 2 ml 1% lidocaine anesthesia, the scc  was excised  1,1x3.0   cm with 3mm margin and sent to pathology for evaluation. The wound was closed with 4.0 nylon suture and covered with sterile gauze. No water exposure to the wound in the next 24 hours. Keflex 500mg po tid  for 7 days for infection prophylaxis. Estimated blood loss: 5cc. Follow up in  10 days for suture removal.

## 2024-05-03 ENCOUNTER — OFFICE VISIT (OUTPATIENT)
Dept: PRIMARY CARE | Facility: CLINIC | Age: 69
End: 2024-05-03
Payer: MEDICARE

## 2024-05-03 DIAGNOSIS — L98.9 SKIN LESION OF CHEST WALL: Primary | ICD-10-CM

## 2024-05-03 PROCEDURE — 99213 OFFICE O/P EST LOW 20 MIN: CPT | Performed by: FAMILY MEDICINE

## 2024-05-03 PROCEDURE — 88305 TISSUE EXAM BY PATHOLOGIST: CPT | Performed by: DERMATOLOGY

## 2024-05-03 PROCEDURE — 11104 PUNCH BX SKIN SINGLE LESION: CPT | Performed by: FAMILY MEDICINE

## 2024-05-03 NOTE — PROGRESS NOTES
Subjective   Patient ID: Shaina Fermin is a 69 y.o. female who presents for chest wall skin lesion for 1 mos and suture removal    HPI   Left chest wall wound healed well. The margin was 0.2 -0.3 mm from pathology report from recent scc excision.  pt noticed a skin lesion at rt upper chest wall 1 mos ago. It grew harder lately. no bleeding or itchiness. pt was concerned about skin cancer and requested evaluation.    Review of Systems    Objective   There were no vitals taken for this visit.    Physical Exam  No acute distress. no cervical or axillary lymphadenopathy,  Lungs: CTA b/l, Heart: RRR, Abdomen: soft, non tenderness. Bowel sound: normal. there was a 0.4x0.6 cm skin lesion at rt  upper chest wall, no tenderness or bleeding, left upper chest wall wound healed well. Patient walks with a good balance.     Assessment/Plan   Problem List Items Addressed This Visit             ICD-10-CM    Skin lesion of chest wall - Primary L98.9     Rt upper chest wall skin lesion, new onset for 1mos and getting harder.   Recommend biopsy for evaluation. Under sterile condition with betadine and 1 ml 1% lidocaine anesthesia, the mole was biopsied and sent to pathology. The wound was covered with sterile gauze. Advise pt to keep area clean and dry. We will notify the pt of the pathology result of the skin biopsy.           Relevant Orders    Dermatopathology- DERM LAB     The left upper wound healed well. The sutures were removed. Advise to keep area dry and clean.

## 2024-05-03 NOTE — ASSESSMENT & PLAN NOTE
Rt upper chest wall skin lesion, new onset for 1mos and getting harder.   Recommend biopsy for evaluation. Under sterile condition with betadine and 1 ml 1% lidocaine anesthesia, the mole was biopsied and sent to pathology. The wound was covered with sterile gauze. Advise pt to keep area clean and dry. We will notify the pt of the pathology result of the skin biopsy.

## 2024-05-07 LAB
LABORATORY COMMENT REPORT: NORMAL
PATH REPORT.FINAL DX SPEC: NORMAL
PATH REPORT.GROSS SPEC: NORMAL
PATH REPORT.RELEVANT HX SPEC: NORMAL
PATH REPORT.TOTAL CANCER: NORMAL

## 2024-05-14 ENCOUNTER — OFFICE VISIT (OUTPATIENT)
Dept: PRIMARY CARE | Facility: CLINIC | Age: 69
End: 2024-05-14
Payer: MEDICARE

## 2024-05-14 ENCOUNTER — APPOINTMENT (OUTPATIENT)
Dept: PRIMARY CARE | Facility: CLINIC | Age: 69
End: 2024-05-14
Payer: MEDICARE

## 2024-05-14 DIAGNOSIS — L98.9 SKIN LESION OF CHEST WALL: Primary | ICD-10-CM

## 2024-05-14 PROCEDURE — 11402 EXC TR-EXT B9+MARG 1.1-2 CM: CPT | Performed by: FAMILY MEDICINE

## 2024-05-15 NOTE — PROGRESS NOTES
Subjective   Patient ID: Shaina Fermin is a 69 y.o. female who presents for skin lesion removal    HPI   Pathology of Rt upper chest wall bx suggested potential early scc and recommended complete removal  Review of Systems    Objective   There were no vitals taken for this visit.    Physical Exam  No acute distress. no cervical or axillary lymphadenopathy,  Lungs: CTA b/l, Heart: RRR, Abdomen: soft, non tenderness. Bowel sound: normal. there was a 0.5x0.5 cm darkish brown skin lesion at rt upper chest wall.  no tenderness or bleeding  Assessment/Plan       Under sterile condition and with 3 ml 1% lidocaine anesthesia, the skin lesion was excised 1.1x3    cm with 3mm margin and sent to pathology for evaluation. The wound was closed with 4.0 nylon suture and covered with sterile gauze. No water exposure to the wound in the next 24 hours. We will call pt regarding pathology  report.  Estimated blood loss: 5cc. Follow up in  14 days for suture removal.

## 2024-05-28 ENCOUNTER — OFFICE VISIT (OUTPATIENT)
Dept: PRIMARY CARE | Facility: CLINIC | Age: 69
End: 2024-05-28
Payer: MEDICARE

## 2024-05-28 DIAGNOSIS — Z48.02 VISIT FOR SUTURE REMOVAL: Primary | ICD-10-CM

## 2024-06-20 ENCOUNTER — HOSPITAL ENCOUNTER (OUTPATIENT)
Dept: RADIOLOGY | Facility: HOSPITAL | Age: 69
Discharge: HOME | End: 2024-06-20
Payer: MEDICARE

## 2024-06-20 DIAGNOSIS — R92.8 ABNORMAL MAMMOGRAM: ICD-10-CM

## 2024-06-20 DIAGNOSIS — R92.8 ABNORMAL MAMMOGRAM: Primary | ICD-10-CM

## 2024-06-20 PROCEDURE — 77061 BREAST TOMOSYNTHESIS UNI: CPT | Mod: RT

## 2024-06-20 PROCEDURE — 76642 ULTRASOUND BREAST LIMITED: CPT | Mod: RT

## 2024-10-04 ENCOUNTER — APPOINTMENT (OUTPATIENT)
Dept: DERMATOLOGY | Facility: CLINIC | Age: 69
End: 2024-10-04
Payer: MEDICARE

## 2024-10-14 ENCOUNTER — APPOINTMENT (OUTPATIENT)
Dept: PRIMARY CARE | Facility: CLINIC | Age: 69
End: 2024-10-14
Payer: MEDICARE

## 2024-10-14 VITALS
DIASTOLIC BLOOD PRESSURE: 76 MMHG | SYSTOLIC BLOOD PRESSURE: 129 MMHG | WEIGHT: 161 LBS | HEIGHT: 67 IN | BODY MASS INDEX: 25.27 KG/M2 | RESPIRATION RATE: 14 BRPM | HEART RATE: 72 BPM

## 2024-10-14 DIAGNOSIS — Z00.00 ROUTINE MEDICAL EXAM: ICD-10-CM

## 2024-10-14 DIAGNOSIS — G25.81 RESTLESS LEGS SYNDROME: ICD-10-CM

## 2024-10-14 DIAGNOSIS — F32.1 DEPRESSION, MAJOR, SINGLE EPISODE, MODERATE (MULTI): Primary | ICD-10-CM

## 2024-10-14 PROBLEM — E53.8 VITAMIN B12 DEFICIENCY: Status: RESOLVED | Noted: 2023-02-13 | Resolved: 2024-10-14

## 2024-10-14 PROBLEM — L98.9 SKIN LESION OF CHEST WALL: Status: RESOLVED | Noted: 2024-04-15 | Resolved: 2024-10-14

## 2024-10-14 PROBLEM — R53.82 CHRONIC FATIGUE: Status: RESOLVED | Noted: 2023-02-13 | Resolved: 2024-10-14

## 2024-10-14 PROCEDURE — 3008F BODY MASS INDEX DOCD: CPT | Performed by: FAMILY MEDICINE

## 2024-10-14 PROCEDURE — 99214 OFFICE O/P EST MOD 30 MIN: CPT | Performed by: FAMILY MEDICINE

## 2024-10-14 PROCEDURE — G0439 PPPS, SUBSEQ VISIT: HCPCS | Performed by: FAMILY MEDICINE

## 2024-10-14 PROCEDURE — 1124F ACP DISCUSS-NO DSCNMKR DOCD: CPT | Performed by: FAMILY MEDICINE

## 2024-10-14 PROCEDURE — 1036F TOBACCO NON-USER: CPT | Performed by: FAMILY MEDICINE

## 2024-10-14 PROCEDURE — 1159F MED LIST DOCD IN RCRD: CPT | Performed by: FAMILY MEDICINE

## 2024-10-14 PROCEDURE — 1160F RVW MEDS BY RX/DR IN RCRD: CPT | Performed by: FAMILY MEDICINE

## 2024-10-14 PROCEDURE — 1170F FXNL STATUS ASSESSED: CPT | Performed by: FAMILY MEDICINE

## 2024-10-14 RX ORDER — BUPROPION HYDROCHLORIDE 150 MG/1
150 TABLET, EXTENDED RELEASE ORAL EVERY 12 HOURS
Qty: 180 TABLET | Refills: 3 | Status: SHIPPED | OUTPATIENT
Start: 2024-10-14

## 2024-10-14 RX ORDER — DULOXETIN HYDROCHLORIDE 60 MG/1
60 CAPSULE, DELAYED RELEASE ORAL DAILY
Qty: 90 CAPSULE | Refills: 3 | Status: SHIPPED | OUTPATIENT
Start: 2024-10-14

## 2024-10-14 RX ORDER — GABAPENTIN 300 MG/1
300 CAPSULE ORAL NIGHTLY
Qty: 90 CAPSULE | Refills: 3 | Status: SHIPPED | OUTPATIENT
Start: 2024-10-14

## 2024-10-14 ASSESSMENT — ACTIVITIES OF DAILY LIVING (ADL)
MANAGING_FINANCES: INDEPENDENT
BATHING: INDEPENDENT
DOING_HOUSEWORK: INDEPENDENT
DRESSING: INDEPENDENT
TAKING_MEDICATION: INDEPENDENT
GROCERY_SHOPPING: INDEPENDENT

## 2024-10-14 ASSESSMENT — PATIENT HEALTH QUESTIONNAIRE - PHQ9
2. FEELING DOWN, DEPRESSED OR HOPELESS: NOT AT ALL
1. LITTLE INTEREST OR PLEASURE IN DOING THINGS: NOT AT ALL
SUM OF ALL RESPONSES TO PHQ9 QUESTIONS 1 AND 2: 0

## 2024-10-14 NOTE — ASSESSMENT & PLAN NOTE
Depression, well controlled. Cont. the same. f/u in 6 mos    Orders:    buPROPion SR (Wellbutrin SR) 150 mg 12 hr tablet; Take 1 tablet (150 mg) by mouth every 12 hours.    DULoxetine (Cymbalta) 60 mg DR capsule; Take 1 capsule (60 mg) by mouth once daily.

## 2024-10-14 NOTE — PROGRESS NOTES
"Subjective   Patient ID: Shaina Fermin is a 69 y.o. female who presents for Medicare Annual Wellness Visit Subsequent (fu).    HPI   Patient has been compliant with taking all  current medications. depressed mood and fatigue were controlled. No HI/SI. Normal appetite.  RLS was controlled with neurontin. No cp, heart palpitation or LE edema. No HA, dizziness, imbalance. No GI upset while on fosamax    Review of Systems    Objective   /76   Pulse 72   Resp 14   Ht 1.702 m (5' 7\")   Wt 73 kg (161 lb)   BMI 25.22 kg/m²     Physical Exam  NAD, well groomed, No sclera icterus.   lungs: CTA b/l, heart: RRR, No LE edema, normal pedal pulses, abd: soft, no tenderness, BS+, normal strength and sensation  at bilateral lower extremities. No skin lesions at bilateral feet.  Good balance. CNII-XII were grossly intact, good judgment and memory. No depressed mood.    Assessment/Plan   Assessment & Plan  Depression, major, single episode, moderate (Multi)  Depression, well controlled. Cont. the same. f/u in 6 mos    Orders:    buPROPion SR (Wellbutrin SR) 150 mg 12 hr tablet; Take 1 tablet (150 mg) by mouth every 12 hours.    DULoxetine (Cymbalta) 60 mg DR capsule; Take 1 capsule (60 mg) by mouth once daily.    Restless legs syndrome  Controlled. Cont neurontin  Orders:    gabapentin (Neurontin) 300 mg capsule; Take 1 capsule (300 mg) by mouth once daily at bedtime.    Routine medical exam  Medicare wellness visit: pt was capable of performing all ADLs and IADLs. Pt has good memory and cognitive function. Recommend healthy diet and regular exercise.  Advise eye exam by an OD yearly for glaucoma screen and dental exam every 6 months. check lipids.   will monitor blood pressure, cholesterol levels and weight regularly. Recommend sunscreen application if exposed to the sun when the UV index is over 2. Recommend shingle vaccines, flu, tetanus vaccine and covid shot.   recommend to update living will and DPOA  pt  had been " taking all current  medications as prescribed.     Orders:    Lipid Panel; Future    TSH with reflex to Free T4 if abnormal; Future    Hemoglobin A1C; Future    Comprehensive Metabolic Panel; Future    CBC; Future

## 2024-10-14 NOTE — ASSESSMENT & PLAN NOTE
Controlled. Cont neurontin  Orders:    gabapentin (Neurontin) 300 mg capsule; Take 1 capsule (300 mg) by mouth once daily at bedtime.

## 2024-10-21 ENCOUNTER — LAB (OUTPATIENT)
Dept: LAB | Facility: LAB | Age: 69
End: 2024-10-21
Payer: MEDICARE

## 2024-10-21 DIAGNOSIS — Z00.00 ROUTINE MEDICAL EXAM: ICD-10-CM

## 2024-10-21 LAB
ALBUMIN SERPL BCP-MCNC: 4 G/DL (ref 3.4–5)
ALP SERPL-CCNC: 74 U/L (ref 33–136)
ALT SERPL W P-5'-P-CCNC: 16 U/L (ref 7–45)
ANION GAP SERPL CALC-SCNC: 10 MMOL/L (ref 10–20)
AST SERPL W P-5'-P-CCNC: 22 U/L (ref 9–39)
BILIRUB SERPL-MCNC: 0.5 MG/DL (ref 0–1.2)
BUN SERPL-MCNC: 11 MG/DL (ref 6–23)
CALCIUM SERPL-MCNC: 9 MG/DL (ref 8.6–10.3)
CHLORIDE SERPL-SCNC: 101 MMOL/L (ref 98–107)
CHOLEST SERPL-MCNC: 167 MG/DL (ref 0–199)
CHOLESTEROL/HDL RATIO: 2.2
CO2 SERPL-SCNC: 31 MMOL/L (ref 21–32)
CREAT SERPL-MCNC: 0.86 MG/DL (ref 0.5–1.05)
EGFRCR SERPLBLD CKD-EPI 2021: 73 ML/MIN/1.73M*2
ERYTHROCYTE [DISTWIDTH] IN BLOOD BY AUTOMATED COUNT: 13.3 % (ref 11.5–14.5)
GLUCOSE SERPL-MCNC: 77 MG/DL (ref 74–99)
HCT VFR BLD AUTO: 44.3 % (ref 36–46)
HDLC SERPL-MCNC: 76.7 MG/DL
HGB BLD-MCNC: 14 G/DL (ref 12–16)
LDLC SERPL CALC-MCNC: 78 MG/DL
MCH RBC QN AUTO: 29.9 PG (ref 26–34)
MCHC RBC AUTO-ENTMCNC: 31.6 G/DL (ref 32–36)
MCV RBC AUTO: 95 FL (ref 80–100)
NON HDL CHOLESTEROL: 90 MG/DL (ref 0–149)
NRBC BLD-RTO: 0 /100 WBCS (ref 0–0)
PLATELET # BLD AUTO: 247 X10*3/UL (ref 150–450)
POTASSIUM SERPL-SCNC: 4.1 MMOL/L (ref 3.5–5.3)
PROT SERPL-MCNC: 6.6 G/DL (ref 6.4–8.2)
RBC # BLD AUTO: 4.69 X10*6/UL (ref 4–5.2)
SODIUM SERPL-SCNC: 138 MMOL/L (ref 136–145)
TRIGL SERPL-MCNC: 60 MG/DL (ref 0–149)
TSH SERPL-ACNC: 3.39 MIU/L (ref 0.44–3.98)
VLDL: 12 MG/DL (ref 0–40)
WBC # BLD AUTO: 5.2 X10*3/UL (ref 4.4–11.3)

## 2024-10-21 PROCEDURE — 80061 LIPID PANEL: CPT

## 2024-10-21 PROCEDURE — 80053 COMPREHEN METABOLIC PANEL: CPT

## 2024-10-21 PROCEDURE — 85027 COMPLETE CBC AUTOMATED: CPT

## 2024-10-21 PROCEDURE — 83036 HEMOGLOBIN GLYCOSYLATED A1C: CPT

## 2024-10-21 PROCEDURE — 84443 ASSAY THYROID STIM HORMONE: CPT

## 2024-10-21 PROCEDURE — 36415 COLL VENOUS BLD VENIPUNCTURE: CPT

## 2024-10-22 LAB
EST. AVERAGE GLUCOSE BLD GHB EST-MCNC: 108 MG/DL
HBA1C MFR BLD: 5.4 %

## 2024-12-03 ENCOUNTER — PREP FOR PROCEDURE (OUTPATIENT)
Dept: SURGERY | Age: 69
End: 2024-12-03

## 2024-12-03 RX ORDER — SODIUM CHLORIDE 9 MG/ML
INJECTION, SOLUTION INTRAVENOUS CONTINUOUS
Status: CANCELLED | OUTPATIENT
Start: 2024-12-03

## 2024-12-03 RX ORDER — SODIUM CHLORIDE 0.9 % (FLUSH) 0.9 %
5-40 SYRINGE (ML) INJECTION PRN
Status: CANCELLED | OUTPATIENT
Start: 2024-12-03

## 2024-12-03 RX ORDER — SODIUM CHLORIDE 0.9 % (FLUSH) 0.9 %
5-40 SYRINGE (ML) INJECTION EVERY 12 HOURS SCHEDULED
Status: CANCELLED | OUTPATIENT
Start: 2024-12-03

## 2024-12-03 RX ORDER — SODIUM CHLORIDE 9 MG/ML
25 INJECTION, SOLUTION INTRAVENOUS PRN
Status: CANCELLED | OUTPATIENT
Start: 2024-12-03

## 2024-12-04 ENCOUNTER — HOSPITAL ENCOUNTER (OUTPATIENT)
Age: 69
Setting detail: OUTPATIENT SURGERY
Discharge: HOME | End: 2024-12-04
Attending: SURGERY | Admitting: SURGERY
Payer: MEDICARE

## 2024-12-04 VITALS
SYSTOLIC BLOOD PRESSURE: 121 MMHG | TEMPERATURE: 98 F | HEIGHT: 67 IN | HEART RATE: 70 BPM | BODY MASS INDEX: 25.43 KG/M2 | WEIGHT: 162 LBS | OXYGEN SATURATION: 95 % | DIASTOLIC BLOOD PRESSURE: 67 MMHG | RESPIRATION RATE: 20 BRPM

## 2024-12-04 DIAGNOSIS — R19.4 CHANGE IN BOWEL HABITS: ICD-10-CM

## 2024-12-04 PROCEDURE — 99152 MOD SED SAME PHYS/QHP 5/>YRS: CPT | Performed by: SURGERY

## 2024-12-04 PROCEDURE — 99153 MOD SED SAME PHYS/QHP EA: CPT | Performed by: SURGERY

## 2024-12-04 PROCEDURE — 7100000010 HC PHASE II RECOVERY - FIRST 15 MIN: Performed by: SURGERY

## 2024-12-04 PROCEDURE — 7100000011 HC PHASE II RECOVERY - ADDTL 15 MIN: Performed by: SURGERY

## 2024-12-04 PROCEDURE — 6360000002 HC RX W HCPCS: Performed by: SURGERY

## 2024-12-04 PROCEDURE — 2709999900 HC NON-CHARGEABLE SUPPLY: Performed by: SURGERY

## 2024-12-04 PROCEDURE — 88305 TISSUE EXAM BY PATHOLOGIST: CPT

## 2024-12-04 PROCEDURE — 3609010300 HC COLONOSCOPY W/BIOPSY SINGLE/MULTIPLE: Performed by: SURGERY

## 2024-12-04 RX ORDER — SODIUM CHLORIDE 0.9 % (FLUSH) 0.9 %
5-40 SYRINGE (ML) INJECTION EVERY 12 HOURS SCHEDULED
Status: DISCONTINUED | OUTPATIENT
Start: 2024-12-04 | End: 2024-12-04 | Stop reason: HOSPADM

## 2024-12-04 RX ORDER — SODIUM CHLORIDE 0.9 % (FLUSH) 0.9 %
5-40 SYRINGE (ML) INJECTION PRN
Status: DISCONTINUED | OUTPATIENT
Start: 2024-12-04 | End: 2024-12-04 | Stop reason: HOSPADM

## 2024-12-04 RX ORDER — MIDAZOLAM HYDROCHLORIDE 1 MG/ML
INJECTION, SOLUTION INTRAMUSCULAR; INTRAVENOUS PRN
Status: DISCONTINUED | OUTPATIENT
Start: 2024-12-04 | End: 2024-12-04 | Stop reason: ALTCHOICE

## 2024-12-04 RX ORDER — MEPERIDINE HYDROCHLORIDE 50 MG/ML
INJECTION INTRAMUSCULAR; INTRAVENOUS; SUBCUTANEOUS PRN
Status: DISCONTINUED | OUTPATIENT
Start: 2024-12-04 | End: 2024-12-04 | Stop reason: ALTCHOICE

## 2024-12-04 RX ORDER — SODIUM CHLORIDE 9 MG/ML
25 INJECTION, SOLUTION INTRAVENOUS PRN
Status: DISCONTINUED | OUTPATIENT
Start: 2024-12-04 | End: 2024-12-04 | Stop reason: HOSPADM

## 2024-12-04 RX ORDER — SODIUM CHLORIDE 9 MG/ML
INJECTION, SOLUTION INTRAVENOUS CONTINUOUS
Status: DISCONTINUED | OUTPATIENT
Start: 2024-12-04 | End: 2024-12-04 | Stop reason: HOSPADM

## 2024-12-04 ASSESSMENT — PAIN - FUNCTIONAL ASSESSMENT
PAIN_FUNCTIONAL_ASSESSMENT: NONE - DENIES PAIN
PAIN_FUNCTIONAL_ASSESSMENT: NONE - DENIES PAIN

## 2024-12-04 NOTE — DISCHARGE INSTR - COC
11/10/2021, 04/08/2022    COVID-ROXI Ulloa, (age 12y+), IM, 50 mcg/0.5 mL 10/26/2022    COVID-19, MODERNA, (age 12y+), IM, 50mcg/0.5mL 09/30/2023       Active Problems:  Patient Active Problem List   Diagnosis Code    Atypical chest pain R07.89    Supraventricular tachycardia (HCC) I47.10    Closed nondisplaced transverse fracture of left patella S82.035A    Palpitations R00.2    Right knee pain M25.561    Osteoporosis M81.0       Isolation/Infection:   Isolation            No Isolation          Patient Infection Status       None to display            Nurse Assessment:  Last Vital Signs: /75   Pulse 92   Temp 97.5 °F (36.4 °C) (Temporal)   Resp 20   Ht 1.702 m (5' 7\")   Wt 73.5 kg (162 lb)   SpO2 97%   BMI 25.37 kg/m²     Last documented pain score (0-10 scale):    Last Weight:   Wt Readings from Last 1 Encounters:   12/04/24 73.5 kg (162 lb)     Mental Status:  {IP PT MENTAL STATUS:20030}    IV Access:  { BASIA IV ACCESS:488647004}    Nursing Mobility/ADLs:  Walking   {CHP DME ADLs:464255626}  Transfer  {CHP DME ADLs:117116130}  Bathing  {CHP DME ADLs:273321253}  Dressing  {CHP DME ADLs:254323840}  Toileting  {CHP DME ADLs:926668917}  Feeding  {P DME ADLs:079000417}  Med Admin  {P DME ADLs:739786062}  Med Delivery   { BASIA MED Delivery:056083767}    Wound Care Documentation and Therapy:        Elimination:  Continence:   Bowel: {YES / NO:19727}  Bladder: {YES / NO:19727}  Urinary Catheter: {Urinary Catheter:049700165}   Colostomy/Ileostomy/Ileal Conduit: {YES / NO:19727}       Date of Last BM: ***  No intake or output data in the 24 hours ending 12/04/24 1017  No intake/output data recorded.    Safety Concerns:     { BASIA Safety Concerns:309812343}    Impairments/Disabilities:      {[facility] Impairments/Disabilities:141555578}    Nutrition Therapy:  Current Nutrition Therapy:   {[facility] BASIA Diet List:845406833}    Routes of Feeding: {CHP DME Other Feedings:837433205}  Liquids: {Slp liquid

## 2024-12-04 NOTE — DISCHARGE INSTR - DIET

## 2024-12-04 NOTE — PRE SEDATION
Sedation Pre-Procedure Note    Patient Name: Maribel Dunham   YOB: 1955  Room/Bed: ENDO POOL ROOM/NONE  Medical Record Number: [id number]  Date: 12/4/2024   Time: 10:12 AM       Indication:  change in bowel habits    Consent: I have discussed with the patient and/or the patient representative the indication, alternatives, and the possible risks and/or complications of the planned procedure and the anesthesia methods. The patient and/or patient representative appear to understand and agree to proceed.    Vital Signs:   Vitals:    12/04/24 0757   BP: 126/75   Pulse: 92   Resp: 20   Temp: 97.5 °F (36.4 °C)   SpO2: 97%       Past Medical History:   has a past medical history of Depression, GERD (gastroesophageal reflux disease), Irregular heart beat, and Restless leg syndrome.    Past Surgical History:   has a past surgical history that includes Upper gastrointestinal endoscopy (12/22/11); cardiovascular stress test (3/8/16); Colonoscopy; pr egd transoral biopsy single/multiple (6/27/2018); Colonoscopy (6/27/2018); Upper gastrointestinal endoscopy (N/A, 1/13/2020); Colonoscopy (N/A, 1/13/2020); and Upper gastrointestinal endoscopy (N/A, 11/17/2023).    Medications:   Scheduled Meds:    sodium chloride flush  5-40 mL IntraVENous 2 times per day     Continuous Infusions:    sodium chloride      sodium chloride       PRN Meds: sodium chloride flush, sodium chloride, meperidine, midazolam  Home Meds:   Prior to Admission medications    Medication Sig Start Date End Date Taking? Authorizing Provider   gabapentin (NEURONTIN) 300 MG capsule Take 1 capsule by mouth nightly.   Yes ProviderLenora MD   buPROPion (WELLBUTRIN XL) 150 MG XL tablet Take 1 tablet by mouth in the morning and 1 tablet in the evening.  .   Yes ProviderLenora MD   duloxetine (CYMBALTA) 60 MG capsule Take 1 capsule by mouth in the morning and at bedtime   Yes Lenora Chamberlain MD   sucralfate (CARAFATE) 1 GM tablet Take 1

## 2024-12-04 NOTE — PROCEDURES
Brief Postoperative Note    Maribel Dunham  YOB: 1955  [id number]    Pre-operative Diagnosis: change in bowel habits    Post-operative Diagnosis: Same    Procedure: colonoscopy descending colon biopsies    Anesthesia: Moderate Sedation    Surgeons/Assistants: none    Estimated Blood Loss: minimal    Complications: None    Specimens: Was Obtained: descending colon    Findings: none specific colitis descending colon redundant transverse and ascending floppy cecum     Electronically signed by Vishal Kay MD on 12/4/2024 at 10:14 AM

## 2024-12-05 NOTE — PROCEDURES
[facility]              [address] [place] [address]                               COLONOSCOPY      PATIENT NAME: RADHA KOVACS             : 1955  MED REC NO: [id number]                        ROOM: [facility] ROOM  ACCOUNT NO: [id number]                       ADMIT DATE: 2024  PROVIDER: Dariana Kay MD      DATE OF PROCEDURE: 2024    SURGEON:  Dariana Kay MD    PREPROCEDURE DIAGNOSIS:  Change in bowel habits.    POSTPROCEDURE DIAGNOSES:  Descending colon, nonspecific colitis; redundant transverse and ascending colon; and floppy cecum.    PROCEDURE:  Consisted of colonoscopy and cold biopsies of descending colon.    BLOOD LOSS:  Negligible.    ANESTHESIA:  Procedure was carried out under moderate sedation.    DESCRIPTION OF PROCEDURE:  The patient left supine position.  Rectal examination was performed, which revealed adequate anal tone.  No masses in rectal ampulla.  Endoscope was then introduced and progressed without difficulty.  There were petechial hemorrhages throughout the rectosigmoid and descending portion of the colon.  Biopsies, cold biopsies were taken.  This was consistent with nonspecific colitis.  The transverse and ascending colon were redundant and the cecum was floppy.  Ileocecal valve was well visualized.  The patient in supine position, endoscope was gradually withdrawn with similar findings.  Biopsy sites were hemostatically well controlled.  The patient and  were informed of the findings.  Appropriate recommendations were given.  The patient will be followed up on outpatient basis in my office for further management as needed.          DARIANA KAY MD      D:  2024 12:14:22     T:  2024 19:38:46     SCE/AQS  Job #:  [id number]     Doc#:  [id number]    CC:   PCP

## 2024-12-09 LAB — SURGICAL PATHOLOGY REPORT: NORMAL

## 2024-12-10 ENCOUNTER — HOSPITAL ENCOUNTER (OUTPATIENT)
Dept: RADIOLOGY | Facility: HOSPITAL | Age: 69
Discharge: HOME | End: 2024-12-10
Payer: MEDICARE

## 2024-12-10 VITALS — WEIGHT: 162 LBS | HEIGHT: 67 IN | BODY MASS INDEX: 25.43 KG/M2

## 2024-12-10 DIAGNOSIS — R92.8 ABNORMAL MAMMOGRAM: ICD-10-CM

## 2024-12-10 PROCEDURE — G0279 TOMOSYNTHESIS, MAMMO: HCPCS | Performed by: RADIOLOGY

## 2024-12-10 PROCEDURE — 77066 DX MAMMO INCL CAD BI: CPT | Performed by: RADIOLOGY

## 2024-12-10 PROCEDURE — 77066 DX MAMMO INCL CAD BI: CPT

## 2024-12-27 ENCOUNTER — HOSPITAL ENCOUNTER (EMERGENCY)
Facility: HOSPITAL | Age: 69
Discharge: HOME | End: 2024-12-27
Attending: EMERGENCY MEDICINE
Payer: MEDICARE

## 2024-12-27 ENCOUNTER — APPOINTMENT (OUTPATIENT)
Dept: CARDIOLOGY | Facility: HOSPITAL | Age: 69
End: 2024-12-27
Payer: MEDICARE

## 2024-12-27 ENCOUNTER — APPOINTMENT (OUTPATIENT)
Dept: RADIOLOGY | Facility: HOSPITAL | Age: 69
End: 2024-12-27
Payer: MEDICARE

## 2024-12-27 VITALS
WEIGHT: 162 LBS | TEMPERATURE: 97 F | HEART RATE: 88 BPM | HEIGHT: 67 IN | SYSTOLIC BLOOD PRESSURE: 142 MMHG | BODY MASS INDEX: 25.43 KG/M2 | RESPIRATION RATE: 15 BRPM | OXYGEN SATURATION: 98 % | DIASTOLIC BLOOD PRESSURE: 88 MMHG

## 2024-12-27 DIAGNOSIS — R00.2 PALPITATIONS: Primary | ICD-10-CM

## 2024-12-27 LAB
ALBUMIN SERPL BCP-MCNC: 4.2 G/DL (ref 3.4–5)
ALP SERPL-CCNC: 79 U/L (ref 33–136)
ALT SERPL W P-5'-P-CCNC: 13 U/L (ref 7–45)
ANION GAP SERPL CALC-SCNC: 12 MMOL/L (ref 10–20)
AST SERPL W P-5'-P-CCNC: 19 U/L (ref 9–39)
BASOPHILS # BLD AUTO: 0.06 X10*3/UL (ref 0–0.1)
BASOPHILS NFR BLD AUTO: 0.9 %
BILIRUB SERPL-MCNC: 0.4 MG/DL (ref 0–1.2)
BUN SERPL-MCNC: 13 MG/DL (ref 6–23)
CALCIUM SERPL-MCNC: 9.1 MG/DL (ref 8.6–10.3)
CARDIAC TROPONIN I PNL SERPL HS: 4 NG/L (ref 0–13)
CHLORIDE SERPL-SCNC: 103 MMOL/L (ref 98–107)
CO2 SERPL-SCNC: 26 MMOL/L (ref 21–32)
CREAT SERPL-MCNC: 0.78 MG/DL (ref 0.5–1.05)
EGFRCR SERPLBLD CKD-EPI 2021: 82 ML/MIN/1.73M*2
EOSINOPHIL # BLD AUTO: 0.15 X10*3/UL (ref 0–0.7)
EOSINOPHIL NFR BLD AUTO: 2.2 %
ERYTHROCYTE [DISTWIDTH] IN BLOOD BY AUTOMATED COUNT: 13.2 % (ref 11.5–14.5)
GLUCOSE SERPL-MCNC: 90 MG/DL (ref 74–99)
HCT VFR BLD AUTO: 41.7 % (ref 36–46)
HGB BLD-MCNC: 13.6 G/DL (ref 12–16)
IMM GRANULOCYTES # BLD AUTO: 0.01 X10*3/UL (ref 0–0.7)
IMM GRANULOCYTES NFR BLD AUTO: 0.1 % (ref 0–0.9)
LYMPHOCYTES # BLD AUTO: 2.21 X10*3/UL (ref 1.2–4.8)
LYMPHOCYTES NFR BLD AUTO: 33 %
MCH RBC QN AUTO: 29.3 PG (ref 26–34)
MCHC RBC AUTO-ENTMCNC: 32.6 G/DL (ref 32–36)
MCV RBC AUTO: 90 FL (ref 80–100)
MONOCYTES # BLD AUTO: 0.51 X10*3/UL (ref 0.1–1)
MONOCYTES NFR BLD AUTO: 7.6 %
NEUTROPHILS # BLD AUTO: 3.75 X10*3/UL (ref 1.2–7.7)
NEUTROPHILS NFR BLD AUTO: 56.2 %
NRBC BLD-RTO: 0 /100 WBCS (ref 0–0)
PLATELET # BLD AUTO: 234 X10*3/UL (ref 150–450)
POTASSIUM SERPL-SCNC: 3.5 MMOL/L (ref 3.5–5.3)
PROT SERPL-MCNC: 7.1 G/DL (ref 6.4–8.2)
RBC # BLD AUTO: 4.64 X10*6/UL (ref 4–5.2)
SODIUM SERPL-SCNC: 137 MMOL/L (ref 136–145)
TSH SERPL-ACNC: 3.06 MIU/L (ref 0.44–3.98)
WBC # BLD AUTO: 6.7 X10*3/UL (ref 4.4–11.3)

## 2024-12-27 PROCEDURE — 85025 COMPLETE CBC W/AUTO DIFF WBC: CPT | Performed by: NURSE PRACTITIONER

## 2024-12-27 PROCEDURE — 71046 X-RAY EXAM CHEST 2 VIEWS: CPT

## 2024-12-27 PROCEDURE — 71046 X-RAY EXAM CHEST 2 VIEWS: CPT | Performed by: RADIOLOGY

## 2024-12-27 PROCEDURE — 36415 COLL VENOUS BLD VENIPUNCTURE: CPT | Performed by: NURSE PRACTITIONER

## 2024-12-27 PROCEDURE — 84484 ASSAY OF TROPONIN QUANT: CPT | Performed by: NURSE PRACTITIONER

## 2024-12-27 PROCEDURE — 99285 EMERGENCY DEPT VISIT HI MDM: CPT | Mod: 25 | Performed by: EMERGENCY MEDICINE

## 2024-12-27 PROCEDURE — 80053 COMPREHEN METABOLIC PANEL: CPT | Performed by: NURSE PRACTITIONER

## 2024-12-27 PROCEDURE — 84443 ASSAY THYROID STIM HORMONE: CPT | Performed by: NURSE PRACTITIONER

## 2024-12-27 PROCEDURE — 93005 ELECTROCARDIOGRAM TRACING: CPT

## 2024-12-27 ASSESSMENT — LIFESTYLE VARIABLES
EVER HAD A DRINK FIRST THING IN THE MORNING TO STEADY YOUR NERVES TO GET RID OF A HANGOVER: NO
HAVE PEOPLE ANNOYED YOU BY CRITICIZING YOUR DRINKING: NO
HAVE YOU EVER FELT YOU SHOULD CUT DOWN ON YOUR DRINKING: NO
EVER FELT BAD OR GUILTY ABOUT YOUR DRINKING: NO
TOTAL SCORE: 0

## 2024-12-27 ASSESSMENT — COLUMBIA-SUICIDE SEVERITY RATING SCALE - C-SSRS
6. HAVE YOU EVER DONE ANYTHING, STARTED TO DO ANYTHING, OR PREPARED TO DO ANYTHING TO END YOUR LIFE?: NO
2. HAVE YOU ACTUALLY HAD ANY THOUGHTS OF KILLING YOURSELF?: NO
1. IN THE PAST MONTH, HAVE YOU WISHED YOU WERE DEAD OR WISHED YOU COULD GO TO SLEEP AND NOT WAKE UP?: NO

## 2024-12-27 ASSESSMENT — HEART SCORE
HEART SCORE: 2
AGE: 65+
TROPONIN: LESS THAN OR EQUAL TO NORMAL LIMIT
RISK FACTORS: NO KNOWN RISK FACTORS
HISTORY: SLIGHTLY SUSPICIOUS
ECG: NORMAL

## 2024-12-27 ASSESSMENT — PAIN - FUNCTIONAL ASSESSMENT: PAIN_FUNCTIONAL_ASSESSMENT: 0-10

## 2024-12-27 ASSESSMENT — PAIN SCALES - GENERAL: PAINLEVEL_OUTOF10: 0 - NO PAIN

## 2024-12-27 ASSESSMENT — PAIN DESCRIPTION - PAIN TYPE: TYPE: ACUTE PAIN

## 2024-12-27 NOTE — ED TRIAGE NOTES
Pt presents for palpitations that have been going on for a few days. She started taking her BP and noted that it was high. She saw her endocrinologist and they said that it was high. She states the palpitations are worse when she's lying down at night. Denies CP, SOB, N/V. She states she has occasional light headedness. Alert and oriented x's 4. Ambulatory.

## 2024-12-28 NOTE — ED PROVIDER NOTES
Chief Complaint   Patient presents with    Palpitations       HPI       69 year old female presents to the Emergency Department today complaining of palpitations since yesterday. Checked her heart rate on her home blood pressure cuff and found it to be as high as 105. Denies any associated fever, chills, headache, neck pain, chest pain, shortness of breath, abdominal pain, nausea, vomiting, diarrhea, constipation, or urinary symptoms.       History provided by:  Patient             Patient History   Past Medical History:   Diagnosis Date    Other specified health status     No pertinent past medical history     Past Surgical History:   Procedure Laterality Date    OTHER SURGICAL HISTORY  06/13/2022    No history of surgery     Family History   Problem Relation Name Age of Onset    Colon cancer Mother      Lung cancer Mother      Colon cancer Other grandparent     Uterine cancer Other grandparent     Stroke Other grandparent      Social History     Tobacco Use    Smoking status: Never    Smokeless tobacco: Never   Substance Use Topics    Alcohol use: Never    Drug use: Never           Physical Exam  Constitutional:       Appearance: Normal appearance.   HENT:      Head: Normocephalic.      Right Ear: Tympanic membrane, ear canal and external ear normal.      Left Ear: Tympanic membrane, ear canal and external ear normal.      Nose: Nose normal.      Mouth/Throat:      Mouth: Mucous membranes are moist.      Pharynx: Oropharynx is clear. No oropharyngeal exudate or posterior oropharyngeal erythema.   Eyes:      Conjunctiva/sclera: Conjunctivae normal.      Pupils: Pupils are equal, round, and reactive to light.   Cardiovascular:      Rate and Rhythm: Normal rate and regular rhythm.      Pulses:           Radial pulses are 3+ on the right side and 3+ on the left side.        Dorsalis pedis pulses are 3+ on the right side and 3+ on the left side.      Heart sounds: Normal heart sounds. No murmur heard.     No friction  rub. No gallop.   Pulmonary:      Effort: Pulmonary effort is normal. No respiratory distress.      Breath sounds: Normal breath sounds. No wheezing, rhonchi or rales.   Abdominal:      General: Abdomen is flat. Bowel sounds are normal.      Palpations: Abdomen is soft.      Tenderness: There is no abdominal tenderness. There is no right CVA tenderness, left CVA tenderness, guarding or rebound. Negative signs include Blankenship's sign and McBurney's sign.   Musculoskeletal:         General: No swelling or deformity.      Cervical back: Full passive range of motion without pain.      Right lower leg: No edema.      Left lower leg: No edema.   Lymphadenopathy:      Cervical: No cervical adenopathy.   Skin:     Capillary Refill: Capillary refill takes less than 2 seconds.      Coloration: Skin is not jaundiced.      Findings: No rash.   Neurological:      General: No focal deficit present.      Mental Status: She is alert and oriented to person, place, and time. Mental status is at baseline.      Gait: Gait is intact.   Psychiatric:         Mood and Affect: Mood normal.         Behavior: Behavior is cooperative.         Labs Reviewed   COMPREHENSIVE METABOLIC PANEL - Normal       Result Value    Glucose 90      Sodium 137      Potassium 3.5      Chloride 103      Bicarbonate 26      Anion Gap 12      Urea Nitrogen 13      Creatinine 0.78      eGFR 82      Calcium 9.1      Albumin 4.2      Alkaline Phosphatase 79      Total Protein 7.1      AST 19      Bilirubin, Total 0.4      ALT 13     TSH - Normal    Thyroid Stimulating Hormone 3.06      Narrative:     TSH testing is performed using different testing methodology at [facility] than at other [facility] hospitals. Direct result comparisons should only be made within the same method.     TROPONIN I, HIGH SENSITIVITY - Normal    Troponin I, High Sensitivity 4      Narrative:     Less than 99th percentile of normal range cutoff-  Female and children under 18 years  old <14 ng/L; Male <21 ng/L: Negative  Repeat testing should be performed if clinically indicated.     Female and children under 18 years old 14-50 ng/L; Male 21-50 ng/L:  Consistent with possible cardiac damage and possible increased clinical   risk. Serial measurements may help to assess extent of myocardial damage.     >50 ng/L: Consistent with cardiac damage, increased clinical risk and  myocardial infarction. Serial measurements may help assess extent of   myocardial damage.      NOTE: Children less than 1 year old may have higher baseline troponin   levels and results should be interpreted in conjunction with the overall   clinical context.     NOTE: Troponin I testing is performed using a different   testing methodology at [facility] than at other   [facility]. Direct result comparisons should only   be made within the same method.   CBC WITH AUTO DIFFERENTIAL    WBC 6.7      nRBC 0.0      RBC 4.64      Hemoglobin 13.6      Hematocrit 41.7      MCV 90      MCH 29.3      MCHC 32.6      RDW 13.2      Platelets 234      Neutrophils % 56.2      Immature Granulocytes %, Automated 0.1      Lymphocytes % 33.0      Monocytes % 7.6      Eosinophils % 2.2      Basophils % 0.9      Neutrophils Absolute 3.75      Immature Granulocytes Absolute, Automated 0.01      Lymphocytes Absolute 2.21      Monocytes Absolute 0.51      Eosinophils Absolute 0.15      Basophils Absolute 0.06         XR chest 2 views   Final Result   No airspace consolidation or pleural effusion.        MACRO:   None        Signed by: Alfred Santamaria 12/27/2024 8:51 PM   Dictation workstation:   [id number]               ED Course & MDM   ED Course as of 12/31/24 0910   Fri Dec 27, 2024   2201 This patient was seen by the TAMIKA.  I have personally seen and examined the patient. I made / approved the management plan and take responsibility for patient management. I reviewed and edited the above documentation where necessary.     Feels  well since being in the emergency department.  Vital signs are stable.  I did review her EKG which does not show any ischemic changes.  Additional workup including CBC, CMP, TSH, troponin, chest x-ray are all within normal limits.  I discussed the results with the patient.  I do believe she is stable for outpatient management.  Follow-up and return precautions were given.    Danya Hook DO  Emergency Medicine   [SP]      ED Course User Index  [SP] Danya Hook DO         Diagnoses as of 12/31/24 0910   Palpitations           Medical Decision Making  EKG interpreted by Dr. Castillo. Indication: palpitations. Findings: NSR with a ventricular rate of 83, normal axis, normal intervals, and no acute ischemic or injury pattern. Impression: No acute pathology.       Patient was seen and evaluated by Dr. Hook. Saline lock was established with labs drawn and results as above. Blood counts, electrolytes, kidney function, liver function, and TSH were unremarkable. Heart Score- 2 with normal EKG and troponin. At this time, we feel that the palpitations are atypical for acute coronary syndrome. We find no underlying evidence of an acute infectious process or pneumothorax on CXR. Clinically, we do not feel they are exhibiting signs of pulmonary embolism or thoracic aortic dissection (no connective tissue disorder, no tachycardia, tachypnea, hypoxia, and mediastinum normal in size on CXR). There is no clinical indication or admission. Follow up with their doctor in 3 days. Return if worse in any way. Discharged in stable condition with computer instructions.    Diagnostic Impression:     1. Acute palpitations           Your medication list        ASK your doctor about these medications        Instructions Last Dose Given Next Dose Due   alendronate 70 mg tablet  Commonly known as: Fosamax      Take 1 tablet (70 mg) by mouth every 7 days.       buPROPion  mg 12 hr tablet  Commonly known as: Wellbutrin SR      Take 1 tablet  (150 mg) by mouth every 12 hours.       DULoxetine 60 mg DR capsule  Commonly known as: Cymbalta      Take 1 capsule (60 mg) by mouth once daily.       gabapentin 300 mg capsule  Commonly known as: Neurontin      Take 1 capsule (300 mg) by mouth once daily at bedtime.                  Procedure  Procedures     Kwame Verma, APRN-CNP  12/27/24 2798      This patient was seen by the TAMIKA.  I have personally seen and examined the patient. I made / approved the management plan and take responsibility for patient management. I reviewed and edited the above documentation where necessary. See attestation in ED Course.        Danya Hook DO  Emergency Medicine       Danya Hook DO  12/31/24 0910

## 2024-12-31 LAB
ATRIAL RATE: 83 BPM
P AXIS: 71 DEGREES
PR INTERVAL: 148 MS
Q ONSET: 252 MS
QRS COUNT: 13 BEATS
QRS DURATION: 91 MS
QT INTERVAL: 365 MS
QTC CALCULATION(BAZETT): 429 MS
QTC FREDERICIA: 406 MS
R AXIS: 27 DEGREES
T AXIS: 19 DEGREES
T OFFSET: 434 MS
VENTRICULAR RATE: 83 BPM

## 2025-01-21 ENCOUNTER — TELEPHONE (OUTPATIENT)
Dept: PRIMARY CARE | Facility: CLINIC | Age: 70
End: 2025-01-21

## 2025-01-21 ENCOUNTER — APPOINTMENT (OUTPATIENT)
Dept: PRIMARY CARE | Facility: CLINIC | Age: 70
End: 2025-01-21
Payer: MEDICARE

## 2025-01-21 DIAGNOSIS — M81.0 OSTEOPOROSIS, UNSPECIFIED OSTEOPOROSIS TYPE, UNSPECIFIED PATHOLOGICAL FRACTURE PRESENCE: ICD-10-CM

## 2025-01-21 RX ORDER — ALENDRONATE SODIUM 70 MG/1
70 TABLET ORAL
Qty: 13 TABLET | Refills: 3 | Status: SHIPPED | OUTPATIENT
Start: 2025-01-21

## 2025-01-21 NOTE — TELEPHONE ENCOUNTER
alendronate (Fosamax) 70 mg tablet//Take 1 tablet (70 mg) by mouth every 7 days.  [facility] - [place], [place]

## 2025-03-04 ENCOUNTER — APPOINTMENT (OUTPATIENT)
Dept: RADIOLOGY | Facility: HOSPITAL | Age: 70
End: 2025-03-04
Payer: MEDICARE

## 2025-03-04 ENCOUNTER — HOSPITAL ENCOUNTER (EMERGENCY)
Facility: HOSPITAL | Age: 70
Discharge: HOME | End: 2025-03-04
Attending: STUDENT IN AN ORGANIZED HEALTH CARE EDUCATION/TRAINING PROGRAM
Payer: MEDICARE

## 2025-03-04 ENCOUNTER — APPOINTMENT (OUTPATIENT)
Dept: CARDIOLOGY | Facility: HOSPITAL | Age: 70
End: 2025-03-04
Payer: MEDICARE

## 2025-03-04 VITALS
TEMPERATURE: 98.2 F | OXYGEN SATURATION: 98 % | RESPIRATION RATE: 16 BRPM | BODY MASS INDEX: 25.9 KG/M2 | SYSTOLIC BLOOD PRESSURE: 168 MMHG | HEIGHT: 67 IN | DIASTOLIC BLOOD PRESSURE: 92 MMHG | HEART RATE: 80 BPM | WEIGHT: 165 LBS

## 2025-03-04 DIAGNOSIS — I10 HYPERTENSION, UNSPECIFIED TYPE: Primary | ICD-10-CM

## 2025-03-04 LAB
ALBUMIN SERPL BCP-MCNC: 4.3 G/DL (ref 3.4–5)
ALP SERPL-CCNC: 92 U/L (ref 33–136)
ALT SERPL W P-5'-P-CCNC: 16 U/L (ref 7–45)
ANION GAP SERPL CALC-SCNC: 11 MMOL/L (ref 10–20)
AST SERPL W P-5'-P-CCNC: 22 U/L (ref 9–39)
BASOPHILS # BLD AUTO: 0.06 X10*3/UL (ref 0–0.1)
BASOPHILS NFR BLD AUTO: 0.7 %
BILIRUB SERPL-MCNC: 0.5 MG/DL (ref 0–1.2)
BUN SERPL-MCNC: 17 MG/DL (ref 6–23)
CALCIUM SERPL-MCNC: 9.8 MG/DL (ref 8.6–10.3)
CARDIAC TROPONIN I PNL SERPL HS: 4 NG/L (ref 0–13)
CARDIAC TROPONIN I PNL SERPL HS: 5 NG/L (ref 0–13)
CHLORIDE SERPL-SCNC: 99 MMOL/L (ref 98–107)
CO2 SERPL-SCNC: 30 MMOL/L (ref 21–32)
CREAT SERPL-MCNC: 0.87 MG/DL (ref 0.5–1.05)
D DIMER PPP FEU-MCNC: 940 NG/ML FEU
EGFRCR SERPLBLD CKD-EPI 2021: 72 ML/MIN/1.73M*2
EOSINOPHIL # BLD AUTO: 0.14 X10*3/UL (ref 0–0.7)
EOSINOPHIL NFR BLD AUTO: 1.7 %
ERYTHROCYTE [DISTWIDTH] IN BLOOD BY AUTOMATED COUNT: 13 % (ref 11.5–14.5)
GLUCOSE SERPL-MCNC: 97 MG/DL (ref 74–99)
HCT VFR BLD AUTO: 40.9 % (ref 36–46)
HGB BLD-MCNC: 13.5 G/DL (ref 12–16)
IMM GRANULOCYTES # BLD AUTO: 0.02 X10*3/UL (ref 0–0.7)
IMM GRANULOCYTES NFR BLD AUTO: 0.2 % (ref 0–0.9)
LYMPHOCYTES # BLD AUTO: 2.41 X10*3/UL (ref 1.2–4.8)
LYMPHOCYTES NFR BLD AUTO: 28.7 %
MCH RBC QN AUTO: 29.3 PG (ref 26–34)
MCHC RBC AUTO-ENTMCNC: 33 G/DL (ref 32–36)
MCV RBC AUTO: 89 FL (ref 80–100)
MONOCYTES # BLD AUTO: 0.7 X10*3/UL (ref 0.1–1)
MONOCYTES NFR BLD AUTO: 8.3 %
NEUTROPHILS # BLD AUTO: 5.07 X10*3/UL (ref 1.2–7.7)
NEUTROPHILS NFR BLD AUTO: 60.4 %
NRBC BLD-RTO: 0 /100 WBCS (ref 0–0)
PLATELET # BLD AUTO: 236 X10*3/UL (ref 150–450)
POTASSIUM SERPL-SCNC: 3.9 MMOL/L (ref 3.5–5.3)
PROT SERPL-MCNC: 7 G/DL (ref 6.4–8.2)
RBC # BLD AUTO: 4.6 X10*6/UL (ref 4–5.2)
SODIUM SERPL-SCNC: 136 MMOL/L (ref 136–145)
WBC # BLD AUTO: 8.4 X10*3/UL (ref 4.4–11.3)

## 2025-03-04 PROCEDURE — 36415 COLL VENOUS BLD VENIPUNCTURE: CPT | Performed by: NURSE PRACTITIONER

## 2025-03-04 PROCEDURE — 71046 X-RAY EXAM CHEST 2 VIEWS: CPT

## 2025-03-04 PROCEDURE — 85025 COMPLETE CBC W/AUTO DIFF WBC: CPT | Performed by: NURSE PRACTITIONER

## 2025-03-04 PROCEDURE — 85379 FIBRIN DEGRADATION QUANT: CPT | Performed by: NURSE PRACTITIONER

## 2025-03-04 PROCEDURE — 93005 ELECTROCARDIOGRAM TRACING: CPT

## 2025-03-04 PROCEDURE — 71046 X-RAY EXAM CHEST 2 VIEWS: CPT | Performed by: STUDENT IN AN ORGANIZED HEALTH CARE EDUCATION/TRAINING PROGRAM

## 2025-03-04 PROCEDURE — 84484 ASSAY OF TROPONIN QUANT: CPT | Performed by: NURSE PRACTITIONER

## 2025-03-04 PROCEDURE — 99285 EMERGENCY DEPT VISIT HI MDM: CPT | Mod: 25 | Performed by: STUDENT IN AN ORGANIZED HEALTH CARE EDUCATION/TRAINING PROGRAM

## 2025-03-04 PROCEDURE — 84075 ASSAY ALKALINE PHOSPHATASE: CPT | Performed by: NURSE PRACTITIONER

## 2025-03-04 RX ORDER — AMLODIPINE BESYLATE 5 MG/1
5 TABLET ORAL DAILY
Qty: 30 TABLET | Refills: 0 | Status: SHIPPED | OUTPATIENT
Start: 2025-03-04 | End: 2025-04-03

## 2025-03-04 ASSESSMENT — COLUMBIA-SUICIDE SEVERITY RATING SCALE - C-SSRS
2. HAVE YOU ACTUALLY HAD ANY THOUGHTS OF KILLING YOURSELF?: NO
6. HAVE YOU EVER DONE ANYTHING, STARTED TO DO ANYTHING, OR PREPARED TO DO ANYTHING TO END YOUR LIFE?: NO
1. IN THE PAST MONTH, HAVE YOU WISHED YOU WERE DEAD OR WISHED YOU COULD GO TO SLEEP AND NOT WAKE UP?: NO

## 2025-03-04 ASSESSMENT — PAIN SCALES - GENERAL: PAINLEVEL_OUTOF10: 0 - NO PAIN

## 2025-03-04 ASSESSMENT — PAIN - FUNCTIONAL ASSESSMENT: PAIN_FUNCTIONAL_ASSESSMENT: 0-10

## 2025-03-05 LAB
ATRIAL RATE: 81 BPM
P AXIS: 75 DEGREES
PR INTERVAL: 153 MS
Q ONSET: 252 MS
QRS COUNT: 13 BEATS
QRS DURATION: 93 MS
QT INTERVAL: 371 MS
QTC CALCULATION(BAZETT): 431 MS
QTC FREDERICIA: 409 MS
R AXIS: 6 DEGREES
T AXIS: 34 DEGREES
T OFFSET: 437 MS
VENTRICULAR RATE: 81 BPM

## 2025-03-05 NOTE — ED TRIAGE NOTES
Patient presents to the ED with c/o 'not feeling right.'  She states she took her BP at home multiple times and it was high, the last one being 174/102.  She is denying CP, only states she feels off.

## 2025-03-05 NOTE — ED PROVIDER NOTES
HPI   Chief Complaint   Patient presents with    Hypertension       This is a 70-year-old  female presenting to the emergency room with complaints of hypertension.  The patient has noticed over the last few doctors visits that her blood pressure has been rather high.  Patient does not have a known history of hypertension.  She has been having periods where she feels kind of foggy.  She checked her blood pressure today and noticed that it was over 170 systolic.  She is not having any chest pain, shortness of breath, palpitations, paresthesias, or focal weakness.  Denies any abdominal pain or alteration in her urine or bowel function.  She denies any fever.  Denies any headache, vision changes, speech changes, syncope, or facial droop.  The patient had a fall last week reported that she had a right rib fracture.  She states that the pain has nearly resolved.  Her blood pressure was noted to be high before she suffered the trauma.      History provided by:  Patient   used: No            Patient History   Past Medical History:   Diagnosis Date    Other specified health status     No pertinent past medical history     Past Surgical History:   Procedure Laterality Date    OTHER SURGICAL HISTORY  06/13/2022    No history of surgery     Family History   Problem Relation Name Age of Onset    Colon cancer Mother      Lung cancer Mother      Colon cancer Other grandparent     Uterine cancer Other grandparent     Stroke Other grandparent      Social History     Tobacco Use    Smoking status: Never    Smokeless tobacco: Never   Substance Use Topics    Alcohol use: Never    Drug use: Never       Physical Exam   ED Triage Vitals [03/04/25 1916]   Temperature Heart Rate Respirations BP   36.8 °C (98.2 °F) 89 16 165/88      Pulse Ox Temp src Heart Rate Source Patient Position   99 % -- -- --      BP Location FiO2 (%)     -- --       Physical Exam  Vitals and nursing note reviewed.   Constitutional:        Appearance: Normal appearance. She is normal weight.   HENT:      Head: Normocephalic and atraumatic.      Right Ear: Tympanic membrane normal.      Left Ear: Tympanic membrane normal.      Nose: Nose normal.      Mouth/Throat:      Mouth: Mucous membranes are moist.      Pharynx: Oropharynx is clear.   Eyes:      Extraocular Movements: Extraocular movements intact.      Conjunctiva/sclera: Conjunctivae normal.      Pupils: Pupils are equal, round, and reactive to light.   Cardiovascular:      Rate and Rhythm: Normal rate and regular rhythm.      Pulses: Normal pulses.   Pulmonary:      Effort: Pulmonary effort is normal.      Breath sounds: Normal breath sounds.   Abdominal:      General: Abdomen is flat. Bowel sounds are normal.      Palpations: Abdomen is soft.   Genitourinary:     Comments: No CVA tenderness or pubic pain.  Musculoskeletal:         General: Normal range of motion.   Skin:     General: Skin is warm and dry.      Capillary Refill: Capillary refill takes less than 2 seconds.   Neurological:      General: No focal deficit present.      Mental Status: She is alert and oriented to person, place, and time.   Psychiatric:         Mood and Affect: Mood normal.           ED Course & MDM   ED Course as of 03/04/25 2110 Tu Mar 04, 2025   2011 ECG 12 lead  Twelve-lead EKG interpreted by me.  Sinus rhythm at a rate of 81.  LA interval is 153, QRS is 93, QT is 371, QTc is 431.  Normal axis.  Normal interval.  No acute ischemia or injury pattern noted. [CT]      ED Course User Index  [CT] MARIA VICTORIA Flowers-CNP         Diagnoses as of 03/04/25 2110   Hypertension, unspecified type                 No data recorded     [place] Coma Scale Score: 15 (03/04/25 1918 : Nena Holt RN)                           Medical Decision Making  The patient was seen and evaluated with the attending physician, Dr. Khanna.  The patient is presenting to the emergency room with complaints of hypertension.  Patient denies  any chest pain, shortness of breath, palpitations, or lower extremity swelling or pain.  The patient's vital signs are stable.  Differential diagnosis includes hypertension, ACS, viral illness, pain secondary to rib fracture, arrhythmia, anemia, or other acute process.  A twelve-lead EKG was obtained and was negative for acute ischemia or arrhythmia.  A saline lock was established and laboratory studies were drawn with results as noted.  Initial and delta troponins were negative.  CBC and CMP were unremarkable.  The patient's D-dimer was elevated at 940.  Using the years criteria, the patient has a very low risk for pulmonary embolism.  We did the patient's vital signs are stable.  Lung sounds were clear.  No shortness of breath.  We do not believe imaging is warranted at this time.  X-ray of the chest was performed and showed no acute cardiopulmonary process.  At this time we felt the patient to be discharged home.  She was provided prescription for Norvasc 5 mg daily.  She is to monitor her blood pressures and provide a log to her primary care physician during her follow-up.  She was advised to follow-up in the next few days.  The patient was given strict return precautions.  The patient was discharged in stable condition with computer discharge instructions given.        Procedure  Procedures     MARIA VICTORIA Flowers-GWEN  03/04/25 6827

## 2025-03-10 ENCOUNTER — APPOINTMENT (OUTPATIENT)
Dept: PRIMARY CARE | Facility: CLINIC | Age: 70
End: 2025-03-10
Payer: MEDICARE

## 2025-03-10 VITALS — SYSTOLIC BLOOD PRESSURE: 145 MMHG | DIASTOLIC BLOOD PRESSURE: 85 MMHG

## 2025-03-10 DIAGNOSIS — R52 PAINFUL PARESTHESIA: ICD-10-CM

## 2025-03-10 DIAGNOSIS — R20.2 PAINFUL PARESTHESIA: ICD-10-CM

## 2025-03-10 DIAGNOSIS — I10 PRIMARY HYPERTENSION: Primary | ICD-10-CM

## 2025-03-10 PROCEDURE — 99214 OFFICE O/P EST MOD 30 MIN: CPT | Performed by: FAMILY MEDICINE

## 2025-03-10 PROCEDURE — G2211 COMPLEX E/M VISIT ADD ON: HCPCS | Performed by: FAMILY MEDICINE

## 2025-03-10 PROCEDURE — 3077F SYST BP >= 140 MM HG: CPT | Performed by: FAMILY MEDICINE

## 2025-03-10 PROCEDURE — 3079F DIAST BP 80-89 MM HG: CPT | Performed by: FAMILY MEDICINE

## 2025-03-10 PROCEDURE — 1159F MED LIST DOCD IN RCRD: CPT | Performed by: FAMILY MEDICINE

## 2025-03-10 PROCEDURE — 1036F TOBACCO NON-USER: CPT | Performed by: FAMILY MEDICINE

## 2025-03-10 PROCEDURE — 1160F RVW MEDS BY RX/DR IN RCRD: CPT | Performed by: FAMILY MEDICINE

## 2025-03-10 RX ORDER — AMLODIPINE BESYLATE 10 MG/1
10 TABLET ORAL DAILY
Qty: 90 TABLET | Refills: 3 | Status: SHIPPED | OUTPATIENT
Start: 2025-03-10 | End: 2025-03-10 | Stop reason: SDUPTHER

## 2025-03-10 RX ORDER — AMLODIPINE BESYLATE 10 MG/1
10 TABLET ORAL DAILY
Qty: 90 TABLET | Refills: 3 | Status: SHIPPED | OUTPATIENT
Start: 2025-03-10

## 2025-03-10 NOTE — PROGRESS NOTES
Subjective   Patient ID: Shaina Fermin is a 70 y.o. female who presents for bp and pain at upper back    HPI   Upper back painful paresthesia for many years. Symptoms were worse lately. No neck pain or arm weakness and  numbness. Patient has been compliant with taking all  current medications.  No blurry vision.  No CP, HA, dizziness, heart palpitation. No claudication or cold LE.  No LE edema. No imbalance or falls. Good mood.     Review of Systems    Objective   /85     Physical Exam  NAD, well groomed, No sclera icterus. lungs: CTA b/l, heart: RRR, No LE edema,  abd: soft, no tenderness, BS+, normal strength and sensation  at bilateral extremities. No skin lesions or rashes at back.  Normal  sensation at upper back, Good balance. CNII-XII were grossly intact,     Assessment/Plan   Assessment & Plan  Primary hypertension  BP has not been controlled. Increase dose of norvasc. Call office if BP is persistently over 130/80. DASH diet and regular exercise.   Orders:    amLODIPine (Norvasc) 10 mg tablet; Take 1 tablet (10 mg) by mouth once daily.    Painful paresthesia  Persistent for many years. Neuro eval  Orders:    Referral to Neurology; Future

## 2025-03-10 NOTE — ASSESSMENT & PLAN NOTE
BP has not been controlled. Increase dose of norvasc. Call office if BP is persistently over 130/80. DASH diet and regular exercise.   Orders:    amLODIPine (Norvasc) 10 mg tablet; Take 1 tablet (10 mg) by mouth once daily.

## 2025-03-28 ENCOUNTER — HOSPITAL ENCOUNTER (EMERGENCY)
Facility: HOSPITAL | Age: 70
Discharge: HOME | End: 2025-03-29
Attending: STUDENT IN AN ORGANIZED HEALTH CARE EDUCATION/TRAINING PROGRAM
Payer: MEDICARE

## 2025-03-28 DIAGNOSIS — R07.9 CHEST PAIN, UNSPECIFIED TYPE: Primary | ICD-10-CM

## 2025-03-28 LAB
BASOPHILS # BLD AUTO: 0.07 X10*3/UL (ref 0–0.1)
BASOPHILS NFR BLD AUTO: 0.9 %
EOSINOPHIL # BLD AUTO: 0.28 X10*3/UL (ref 0–0.7)
EOSINOPHIL NFR BLD AUTO: 3.5 %
ERYTHROCYTE [DISTWIDTH] IN BLOOD BY AUTOMATED COUNT: 13.1 % (ref 11.5–14.5)
HCT VFR BLD AUTO: 41.1 % (ref 36–46)
HGB BLD-MCNC: 13.4 G/DL (ref 12–16)
IMM GRANULOCYTES # BLD AUTO: 0.02 X10*3/UL (ref 0–0.7)
IMM GRANULOCYTES NFR BLD AUTO: 0.3 % (ref 0–0.9)
LYMPHOCYTES # BLD AUTO: 2.86 X10*3/UL (ref 1.2–4.8)
LYMPHOCYTES NFR BLD AUTO: 36.2 %
MCH RBC QN AUTO: 29.1 PG (ref 26–34)
MCHC RBC AUTO-ENTMCNC: 32.6 G/DL (ref 32–36)
MCV RBC AUTO: 89 FL (ref 80–100)
MONOCYTES # BLD AUTO: 0.67 X10*3/UL (ref 0.1–1)
MONOCYTES NFR BLD AUTO: 8.5 %
NEUTROPHILS # BLD AUTO: 4.01 X10*3/UL (ref 1.2–7.7)
NEUTROPHILS NFR BLD AUTO: 50.6 %
NRBC BLD-RTO: 0 /100 WBCS (ref 0–0)
PLATELET # BLD AUTO: 244 X10*3/UL (ref 150–450)
RBC # BLD AUTO: 4.61 X10*6/UL (ref 4–5.2)
WBC # BLD AUTO: 7.9 X10*3/UL (ref 4.4–11.3)

## 2025-03-28 PROCEDURE — 93005 ELECTROCARDIOGRAM TRACING: CPT

## 2025-03-28 PROCEDURE — 99285 EMERGENCY DEPT VISIT HI MDM: CPT | Performed by: STUDENT IN AN ORGANIZED HEALTH CARE EDUCATION/TRAINING PROGRAM

## 2025-03-28 PROCEDURE — 80053 COMPREHEN METABOLIC PANEL: CPT | Performed by: STUDENT IN AN ORGANIZED HEALTH CARE EDUCATION/TRAINING PROGRAM

## 2025-03-28 PROCEDURE — 85025 COMPLETE CBC W/AUTO DIFF WBC: CPT | Performed by: STUDENT IN AN ORGANIZED HEALTH CARE EDUCATION/TRAINING PROGRAM

## 2025-03-28 PROCEDURE — 83690 ASSAY OF LIPASE: CPT | Performed by: STUDENT IN AN ORGANIZED HEALTH CARE EDUCATION/TRAINING PROGRAM

## 2025-03-28 PROCEDURE — 84484 ASSAY OF TROPONIN QUANT: CPT | Performed by: STUDENT IN AN ORGANIZED HEALTH CARE EDUCATION/TRAINING PROGRAM

## 2025-03-28 PROCEDURE — 36415 COLL VENOUS BLD VENIPUNCTURE: CPT | Performed by: STUDENT IN AN ORGANIZED HEALTH CARE EDUCATION/TRAINING PROGRAM

## 2025-03-28 PROCEDURE — 83735 ASSAY OF MAGNESIUM: CPT | Performed by: STUDENT IN AN ORGANIZED HEALTH CARE EDUCATION/TRAINING PROGRAM

## 2025-03-28 ASSESSMENT — LIFESTYLE VARIABLES
HAVE PEOPLE ANNOYED YOU BY CRITICIZING YOUR DRINKING: NO
EVER HAD A DRINK FIRST THING IN THE MORNING TO STEADY YOUR NERVES TO GET RID OF A HANGOVER: NO
EVER FELT BAD OR GUILTY ABOUT YOUR DRINKING: NO
HAVE YOU EVER FELT YOU SHOULD CUT DOWN ON YOUR DRINKING: NO
TOTAL SCORE: 0

## 2025-03-28 ASSESSMENT — PAIN - FUNCTIONAL ASSESSMENT: PAIN_FUNCTIONAL_ASSESSMENT: 0-10

## 2025-03-29 ENCOUNTER — APPOINTMENT (OUTPATIENT)
Dept: RADIOLOGY | Facility: HOSPITAL | Age: 70
End: 2025-03-29
Payer: MEDICARE

## 2025-03-29 ENCOUNTER — APPOINTMENT (OUTPATIENT)
Dept: CARDIOLOGY | Facility: HOSPITAL | Age: 70
End: 2025-03-29
Payer: MEDICARE

## 2025-03-29 VITALS
HEART RATE: 79 BPM | SYSTOLIC BLOOD PRESSURE: 138 MMHG | WEIGHT: 165 LBS | BODY MASS INDEX: 25.9 KG/M2 | HEIGHT: 67 IN | TEMPERATURE: 97.2 F | OXYGEN SATURATION: 98 % | DIASTOLIC BLOOD PRESSURE: 82 MMHG | RESPIRATION RATE: 14 BRPM

## 2025-03-29 LAB
ALBUMIN SERPL BCP-MCNC: 4.1 G/DL (ref 3.4–5)
ALP SERPL-CCNC: 89 U/L (ref 33–136)
ALT SERPL W P-5'-P-CCNC: 17 U/L (ref 7–45)
ANION GAP SERPL CALC-SCNC: 10 MMOL/L (ref 10–20)
AST SERPL W P-5'-P-CCNC: 20 U/L (ref 9–39)
BILIRUB SERPL-MCNC: 0.3 MG/DL (ref 0–1.2)
BUN SERPL-MCNC: 15 MG/DL (ref 6–23)
CALCIUM SERPL-MCNC: 9.5 MG/DL (ref 8.6–10.3)
CARDIAC TROPONIN I PNL SERPL HS: 4 NG/L (ref 0–13)
CARDIAC TROPONIN I PNL SERPL HS: 4 NG/L (ref 0–13)
CHLORIDE SERPL-SCNC: 105 MMOL/L (ref 98–107)
CO2 SERPL-SCNC: 27 MMOL/L (ref 21–32)
CREAT SERPL-MCNC: 0.82 MG/DL (ref 0.5–1.05)
EGFRCR SERPLBLD CKD-EPI 2021: 77 ML/MIN/1.73M*2
GLUCOSE SERPL-MCNC: 120 MG/DL (ref 74–99)
LIPASE SERPL-CCNC: 39 U/L (ref 9–82)
MAGNESIUM SERPL-MCNC: 1.93 MG/DL (ref 1.6–2.4)
POTASSIUM SERPL-SCNC: 3.7 MMOL/L (ref 3.5–5.3)
PROT SERPL-MCNC: 6.7 G/DL (ref 6.4–8.2)
SODIUM SERPL-SCNC: 138 MMOL/L (ref 136–145)

## 2025-03-29 PROCEDURE — 71046 X-RAY EXAM CHEST 2 VIEWS: CPT

## 2025-03-29 PROCEDURE — 36415 COLL VENOUS BLD VENIPUNCTURE: CPT | Performed by: STUDENT IN AN ORGANIZED HEALTH CARE EDUCATION/TRAINING PROGRAM

## 2025-03-29 PROCEDURE — 84484 ASSAY OF TROPONIN QUANT: CPT | Performed by: STUDENT IN AN ORGANIZED HEALTH CARE EDUCATION/TRAINING PROGRAM

## 2025-03-29 PROCEDURE — 71046 X-RAY EXAM CHEST 2 VIEWS: CPT | Performed by: STUDENT IN AN ORGANIZED HEALTH CARE EDUCATION/TRAINING PROGRAM

## 2025-03-29 NOTE — ED PROVIDER NOTES
HPI   Chief Complaint   Patient presents with    Chest Pain     Pt c/o chest pressure and left arm numbness. Arm numbness began yesterday, went away, returned today, now with midsternal chest pressure.        This is a 70-year-old female past medical history hypertension who presents to the emergency department for chest pain.  Patient states that she began having chest pain about 2 hours prior to arrival.  Show states is been having intermittent left arm numbness tingling.  States that her symptoms are improving and her almost gone.  She states she had chest pain before and was very similar.  She is yesterday she had burning sensation in her chest she does have a history of heartburn.  Denies any other issues otherwise.                          [place] Coma Scale Score: 15                  Patient History   Past Medical History:   Diagnosis Date    Other specified health status     No pertinent past medical history     Past Surgical History:   Procedure Laterality Date    OTHER SURGICAL HISTORY  06/13/2022    No history of surgery     Family History   Problem Relation Name Age of Onset    Colon cancer Mother      Lung cancer Mother      Colon cancer Other grandparent     Uterine cancer Other grandparent     Stroke Other grandparent      Social History     Tobacco Use    Smoking status: Never    Smokeless tobacco: Never   Substance Use Topics    Alcohol use: Never    Drug use: Never       Physical Exam   ED Triage Vitals [03/28/25 2248]   Temperature Heart Rate Respirations BP   36.2 °C (97.2 °F) 92 20 137/88      Pulse Ox Temp Source Heart Rate Source Patient Position   98 % Temporal -- --      BP Location FiO2 (%)     -- --       Physical Exam  Constitutional:       General: She is not in acute distress.  HENT:      Head: Normocephalic and atraumatic.      Right Ear: Tympanic membrane normal.      Left Ear: Tympanic membrane normal.      Mouth/Throat:      Mouth: Mucous membranes are moist.   Eyes:       Extraocular Movements: Extraocular movements intact.      Conjunctiva/sclera: Conjunctivae normal.      Pupils: Pupils are equal, round, and reactive to light.   Cardiovascular:      Rate and Rhythm: Normal rate and regular rhythm.      Heart sounds: No murmur heard.  Pulmonary:      Effort: Pulmonary effort is normal. No respiratory distress.      Breath sounds: Normal breath sounds. No stridor. No wheezing or rales.   Abdominal:      General: Bowel sounds are normal. There is no distension.      Tenderness: There is no abdominal tenderness. There is no guarding or rebound.   Musculoskeletal:         General: No swelling, tenderness or deformity. Normal range of motion.   Skin:     General: Skin is warm and dry.      Coloration: Skin is not jaundiced.      Findings: No bruising or lesion.   Neurological:      General: No focal deficit present.      Mental Status: She is alert and oriented to person, place, and time. Mental status is at baseline.      Cranial Nerves: No cranial nerve deficit.      Motor: No weakness.   Psychiatric:         Mood and Affect: Mood normal.       Labs Reviewed - No data to display  No orders to display       ED Course & MDM   ED Course as of 03/29/25 0206   Fri Mar 28, 2025   2343 EKG on my read shows sinus rhythm at a rate of 77 bpm no ST change elevation nonischemic EKG, normal intervals. [CF]   Sat Mar 29, 2025   0052 IMPRESSION:  1. No acute cardiopulmonary process.   [CF]      ED Course User Index  [CF] Ansley Khanna MD       Medical Decision Making  This is a 70-year-old female presents emerged part for chest pain that started at 2 hours prior to her arrival and some left arm numbness that has resolved.  On my exam her NIH score is 0 sensation similar to the other side.  Lungs are clear.  EKG is nonischemic her troponins are negative x 2 no signs of anemia x-ray shows no pneumonia or pneumothorax.  At this time I have low suspicion for ACS.  I will give patient a stress test  outpatient.  She will continue return precautions.  At this time she is not having any symptoms.        Procedure  Procedures     Ansley Khanna MD  03/29/25 0207

## 2025-04-01 ENCOUNTER — HOSPITAL ENCOUNTER (OUTPATIENT)
Dept: CARDIOLOGY | Facility: HOSPITAL | Age: 70
Discharge: HOME | End: 2025-04-01
Payer: MEDICARE

## 2025-04-01 DIAGNOSIS — R07.9 CHEST PAIN: ICD-10-CM

## 2025-04-01 PROCEDURE — 93016 CV STRESS TEST SUPVJ ONLY: CPT | Performed by: INTERNAL MEDICINE

## 2025-04-01 PROCEDURE — C8928 TTE W OR W/O FOL W/CON,STRES: HCPCS

## 2025-04-01 PROCEDURE — 2500000004 HC RX 250 GENERAL PHARMACY W/ HCPCS (ALT 636 FOR OP/ED): Performed by: STUDENT IN AN ORGANIZED HEALTH CARE EDUCATION/TRAINING PROGRAM

## 2025-04-01 PROCEDURE — 93018 CV STRESS TEST I&R ONLY: CPT | Performed by: INTERNAL MEDICINE

## 2025-04-01 PROCEDURE — 93350 STRESS TTE ONLY: CPT | Performed by: INTERNAL MEDICINE

## 2025-04-01 RX ADMIN — PERFLUTREN 8 ML OF DILUTION: 6.52 INJECTION, SUSPENSION INTRAVENOUS at 14:20

## 2025-04-02 LAB
ATRIAL RATE: 77 BPM
P AXIS: 72 DEGREES
PR INTERVAL: 157 MS
Q ONSET: 249 MS
QRS COUNT: 13 BEATS
QRS DURATION: 95 MS
QT INTERVAL: 366 MS
QTC CALCULATION(BAZETT): 415 MS
QTC FREDERICIA: 397 MS
R AXIS: 0 DEGREES
T AXIS: 25 DEGREES
T OFFSET: 432 MS
VENTRICULAR RATE: 77 BPM

## 2025-04-02 NOTE — NURSING NOTE
The following instructions were provided to the patient: The doctor felt your test was normal. You still need to follow up with your primary care provider within 1 month for discussion of results. In the meantime, if you have recurrence of chest pain lasting longer than 10-15 minutes, please call 911 and return to ER.  Patient verbalizes understanding.

## 2025-04-14 ENCOUNTER — APPOINTMENT (OUTPATIENT)
Dept: PRIMARY CARE | Facility: CLINIC | Age: 70
End: 2025-04-14
Payer: MEDICARE

## 2025-04-21 ENCOUNTER — APPOINTMENT (OUTPATIENT)
Dept: PRIMARY CARE | Facility: CLINIC | Age: 70
End: 2025-04-21
Payer: MEDICARE

## 2025-04-21 VITALS — DIASTOLIC BLOOD PRESSURE: 63 MMHG | SYSTOLIC BLOOD PRESSURE: 106 MMHG

## 2025-04-21 DIAGNOSIS — I10 PRIMARY HYPERTENSION: ICD-10-CM

## 2025-04-21 DIAGNOSIS — G25.81 RESTLESS LEGS SYNDROME: ICD-10-CM

## 2025-04-21 DIAGNOSIS — F32.1 DEPRESSION, MAJOR, SINGLE EPISODE, MODERATE (MULTI): ICD-10-CM

## 2025-04-21 DIAGNOSIS — M81.0 OSTEOPOROSIS, UNSPECIFIED OSTEOPOROSIS TYPE, UNSPECIFIED PATHOLOGICAL FRACTURE PRESENCE: ICD-10-CM

## 2025-04-21 PROCEDURE — 1159F MED LIST DOCD IN RCRD: CPT | Performed by: FAMILY MEDICINE

## 2025-04-21 PROCEDURE — 1160F RVW MEDS BY RX/DR IN RCRD: CPT | Performed by: FAMILY MEDICINE

## 2025-04-21 PROCEDURE — 3074F SYST BP LT 130 MM HG: CPT | Performed by: FAMILY MEDICINE

## 2025-04-21 PROCEDURE — G2211 COMPLEX E/M VISIT ADD ON: HCPCS | Performed by: FAMILY MEDICINE

## 2025-04-21 PROCEDURE — 1036F TOBACCO NON-USER: CPT | Performed by: FAMILY MEDICINE

## 2025-04-21 PROCEDURE — 3078F DIAST BP <80 MM HG: CPT | Performed by: FAMILY MEDICINE

## 2025-04-21 PROCEDURE — 99214 OFFICE O/P EST MOD 30 MIN: CPT | Performed by: FAMILY MEDICINE

## 2025-04-21 RX ORDER — ALENDRONATE SODIUM 70 MG/1
70 TABLET ORAL
Qty: 13 TABLET | Refills: 3 | Status: SHIPPED | OUTPATIENT
Start: 2025-04-21

## 2025-04-21 RX ORDER — DULOXETIN HYDROCHLORIDE 60 MG/1
60 CAPSULE, DELAYED RELEASE ORAL DAILY
Qty: 90 CAPSULE | Refills: 3 | Status: SHIPPED | OUTPATIENT
Start: 2025-04-21

## 2025-04-21 RX ORDER — BUPROPION HYDROCHLORIDE 150 MG/1
150 TABLET, EXTENDED RELEASE ORAL EVERY 12 HOURS
Qty: 180 TABLET | Refills: 3 | Status: SHIPPED | OUTPATIENT
Start: 2025-04-21

## 2025-04-21 RX ORDER — AMLODIPINE BESYLATE 10 MG/1
10 TABLET ORAL DAILY
Qty: 90 TABLET | Refills: 3 | Status: SHIPPED | OUTPATIENT
Start: 2025-04-21

## 2025-04-21 RX ORDER — GABAPENTIN 300 MG/1
300 CAPSULE ORAL NIGHTLY
Qty: 90 CAPSULE | Refills: 3 | Status: SHIPPED | OUTPATIENT
Start: 2025-04-21

## 2025-04-21 NOTE — ASSESSMENT & PLAN NOTE
No se from taking fosamax. Cont the same.  cont Calcium and vit d supplement and wt bearing exercise  Orders:    alendronate (Fosamax) 70 mg tablet; Take 1 tablet (70 mg) by mouth every 7 days.

## 2025-04-21 NOTE — PROGRESS NOTES
Subjective   Patient ID: Shaina Fermin is a 70 y.o. female who presents for No chief complaint on file..    HPI   pt denies depressed mood, lack of interests or fatigue while on current antidepressants. No HI/SI. Normal appetite.  good sleep. No cp, heart palpitation or LE edema. No HA, dizziness, imbalance. No SE from taking fosamax. RLS was controlled with Neurontin. No imbalance or confusion    Review of Systems    Objective   /63     Physical Exam  NAD, well groomed, No sclera icterus.   lungs: CTA b/l, heart: RRR, No LE edema, abd: soft, no tenderness, BS+, Good balance. CNII-XII were grossly intact, good judgment and memory. No depressed mood. No hi/si    Assessment/Plan   Assessment & Plan  Osteoporosis, unspecified osteoporosis type, unspecified pathological fracture presence  No se from taking fosamax. Cont the same.  cont Calcium and vit d supplement and wt bearing exercise  Orders:    alendronate (Fosamax) 70 mg tablet; Take 1 tablet (70 mg) by mouth every 7 days.    Depression, major, single episode, moderate (Multi)  Depression, well controlled with wellbutrin and cymbalta. No HI/SI. Cont. the same. f/u in 6 mos    Orders:    buPROPion SR (Wellbutrin SR) 150 mg 12 hr tablet; Take 1 tablet (150 mg) by mouth every 12 hours.    DULoxetine (Cymbalta) 60 mg DR capsule; Take 1 capsule (60 mg) by mouth once daily.    Restless legs syndrome  Controlled. No se. Cont the same.  Orders:    gabapentin (Neurontin) 300 mg capsule; Take 1 capsule (300 mg) by mouth once daily at bedtime.

## 2025-04-21 NOTE — ASSESSMENT & PLAN NOTE
Controlled. No se. Cont the same.  Orders:    gabapentin (Neurontin) 300 mg capsule; Take 1 capsule (300 mg) by mouth once daily at bedtime.

## 2025-04-21 NOTE — ASSESSMENT & PLAN NOTE
Depression, well controlled with wellbutrin and cymbalta. No HI/SI. Cont. the same. f/u in 6 mos    Orders:    buPROPion SR (Wellbutrin SR) 150 mg 12 hr tablet; Take 1 tablet (150 mg) by mouth every 12 hours.    DULoxetine (Cymbalta) 60 mg DR capsule; Take 1 capsule (60 mg) by mouth once daily.

## 2025-04-25 NOTE — PROGRESS NOTES
Called left message for patient to remind her to have labs before appointment next week left infusion number or return call if any questions

## 2025-04-28 ENCOUNTER — HOSPITAL ENCOUNTER (OUTPATIENT)
Facility: HOSPITAL | Age: 70
Setting detail: OBSERVATION
Discharge: HOME | End: 2025-04-29
Attending: STUDENT IN AN ORGANIZED HEALTH CARE EDUCATION/TRAINING PROGRAM | Admitting: INTERNAL MEDICINE
Payer: MEDICARE

## 2025-04-28 ENCOUNTER — ANESTHESIA (OUTPATIENT)
Dept: OPERATING ROOM | Facility: HOSPITAL | Age: 70
End: 2025-04-28
Payer: MEDICARE

## 2025-04-28 ENCOUNTER — APPOINTMENT (OUTPATIENT)
Dept: RADIOLOGY | Facility: HOSPITAL | Age: 70
End: 2025-04-28
Payer: MEDICARE

## 2025-04-28 ENCOUNTER — ANESTHESIA EVENT (OUTPATIENT)
Dept: OPERATING ROOM | Facility: HOSPITAL | Age: 70
End: 2025-04-28
Payer: MEDICARE

## 2025-04-28 DIAGNOSIS — K35.80 ACUTE APPENDICITIS, UNSPECIFIED ACUTE APPENDICITIS TYPE: Primary | ICD-10-CM

## 2025-04-28 PROBLEM — K37 APPENDICITIS: Status: ACTIVE | Noted: 2025-04-28

## 2025-04-28 LAB
ALBUMIN SERPL BCP-MCNC: 4.3 G/DL (ref 3.4–5)
ALP SERPL-CCNC: 100 U/L (ref 33–136)
ALT SERPL W P-5'-P-CCNC: 14 U/L (ref 7–45)
ANION GAP SERPL CALC-SCNC: 14 MMOL/L (ref 10–20)
APPEARANCE UR: CLEAR
AST SERPL W P-5'-P-CCNC: 18 U/L (ref 9–39)
BASOPHILS # BLD AUTO: 0.06 X10*3/UL (ref 0–0.1)
BASOPHILS NFR BLD AUTO: 0.4 %
BILIRUB SERPL-MCNC: 0.5 MG/DL (ref 0–1.2)
BILIRUB UR STRIP.AUTO-MCNC: NEGATIVE MG/DL
BUN SERPL-MCNC: 15 MG/DL (ref 6–23)
CALCIUM SERPL-MCNC: 9.8 MG/DL (ref 8.6–10.3)
CHLORIDE SERPL-SCNC: 102 MMOL/L (ref 98–107)
CO2 SERPL-SCNC: 26 MMOL/L (ref 21–32)
COLOR UR: ABNORMAL
CREAT SERPL-MCNC: 0.87 MG/DL (ref 0.5–1.05)
EGFRCR SERPLBLD CKD-EPI 2021: 72 ML/MIN/1.73M*2
EOSINOPHIL # BLD AUTO: 0.02 X10*3/UL (ref 0–0.7)
EOSINOPHIL NFR BLD AUTO: 0.1 %
ERYTHROCYTE [DISTWIDTH] IN BLOOD BY AUTOMATED COUNT: 13 % (ref 11.5–14.5)
GLUCOSE SERPL-MCNC: 123 MG/DL (ref 74–99)
GLUCOSE UR STRIP.AUTO-MCNC: NORMAL MG/DL
HCT VFR BLD AUTO: 42.5 % (ref 36–46)
HGB BLD-MCNC: 13.9 G/DL (ref 12–16)
HOLD SPECIMEN: NORMAL
IMM GRANULOCYTES # BLD AUTO: 0.04 X10*3/UL (ref 0–0.7)
IMM GRANULOCYTES NFR BLD AUTO: 0.3 % (ref 0–0.9)
KETONES UR STRIP.AUTO-MCNC: ABNORMAL MG/DL
LACTATE SERPL-SCNC: 1.7 MMOL/L (ref 0.4–2)
LEUKOCYTE ESTERASE UR QL STRIP.AUTO: NEGATIVE
LYMPHOCYTES # BLD AUTO: 1.66 X10*3/UL (ref 1.2–4.8)
LYMPHOCYTES NFR BLD AUTO: 12.4 %
MCH RBC QN AUTO: 29 PG (ref 26–34)
MCHC RBC AUTO-ENTMCNC: 32.7 G/DL (ref 32–36)
MCV RBC AUTO: 89 FL (ref 80–100)
MONOCYTES # BLD AUTO: 0.76 X10*3/UL (ref 0.1–1)
MONOCYTES NFR BLD AUTO: 5.7 %
NEUTROPHILS # BLD AUTO: 10.84 X10*3/UL (ref 1.2–7.7)
NEUTROPHILS NFR BLD AUTO: 81.1 %
NITRITE UR QL STRIP.AUTO: NEGATIVE
NRBC BLD-RTO: 0 /100 WBCS (ref 0–0)
PH UR STRIP.AUTO: 8 [PH]
PLATELET # BLD AUTO: 255 X10*3/UL (ref 150–450)
POTASSIUM SERPL-SCNC: 4.2 MMOL/L (ref 3.5–5.3)
PROT SERPL-MCNC: 7.3 G/DL (ref 6.4–8.2)
PROT UR STRIP.AUTO-MCNC: NEGATIVE MG/DL
RBC # BLD AUTO: 4.79 X10*6/UL (ref 4–5.2)
RBC # UR STRIP.AUTO: NEGATIVE MG/DL
SODIUM SERPL-SCNC: 138 MMOL/L (ref 136–145)
SP GR UR STRIP.AUTO: 1.02
UROBILINOGEN UR STRIP.AUTO-MCNC: NORMAL MG/DL
WBC # BLD AUTO: 13.4 X10*3/UL (ref 4.4–11.3)

## 2025-04-28 PROCEDURE — 2500000004 HC RX 250 GENERAL PHARMACY W/ HCPCS (ALT 636 FOR OP/ED): Mod: JZ | Performed by: ANESTHESIOLOGY

## 2025-04-28 PROCEDURE — 88304 TISSUE EXAM BY PATHOLOGIST: CPT | Mod: TC,PORLAB | Performed by: STUDENT IN AN ORGANIZED HEALTH CARE EDUCATION/TRAINING PROGRAM

## 2025-04-28 PROCEDURE — 7100000001 HC RECOVERY ROOM TIME - INITIAL BASE CHARGE: Performed by: SURGERY

## 2025-04-28 PROCEDURE — 2500000004 HC RX 250 GENERAL PHARMACY W/ HCPCS (ALT 636 FOR OP/ED): Mod: JZ | Performed by: STUDENT IN AN ORGANIZED HEALTH CARE EDUCATION/TRAINING PROGRAM

## 2025-04-28 PROCEDURE — 3700000002 HC GENERAL ANESTHESIA TIME - EACH INCREMENTAL 1 MINUTE: Performed by: SURGERY

## 2025-04-28 PROCEDURE — 81003 URINALYSIS AUTO W/O SCOPE: CPT | Performed by: NURSE PRACTITIONER

## 2025-04-28 PROCEDURE — 2550000001 HC RX 255 CONTRASTS: Mod: JZ | Performed by: NURSE PRACTITIONER

## 2025-04-28 PROCEDURE — 2500000005 HC RX 250 GENERAL PHARMACY W/O HCPCS: Mod: JZ | Performed by: SURGERY

## 2025-04-28 PROCEDURE — 51798 US URINE CAPACITY MEASURE: CPT

## 2025-04-28 PROCEDURE — 2500000004 HC RX 250 GENERAL PHARMACY W/ HCPCS (ALT 636 FOR OP/ED): Mod: JZ | Performed by: NURSE PRACTITIONER

## 2025-04-28 PROCEDURE — 99285 EMERGENCY DEPT VISIT HI MDM: CPT | Mod: 25 | Performed by: STUDENT IN AN ORGANIZED HEALTH CARE EDUCATION/TRAINING PROGRAM

## 2025-04-28 PROCEDURE — 96376 TX/PRO/DX INJ SAME DRUG ADON: CPT | Mod: 59

## 2025-04-28 PROCEDURE — 83605 ASSAY OF LACTIC ACID: CPT | Performed by: NURSE PRACTITIONER

## 2025-04-28 PROCEDURE — 36415 COLL VENOUS BLD VENIPUNCTURE: CPT | Performed by: NURSE PRACTITIONER

## 2025-04-28 PROCEDURE — 84075 ASSAY ALKALINE PHOSPHATASE: CPT | Performed by: NURSE PRACTITIONER

## 2025-04-28 PROCEDURE — G0378 HOSPITAL OBSERVATION PER HR: HCPCS

## 2025-04-28 PROCEDURE — 96365 THER/PROPH/DIAG IV INF INIT: CPT | Mod: 59

## 2025-04-28 PROCEDURE — 3700000001 HC GENERAL ANESTHESIA TIME - INITIAL BASE CHARGE: Performed by: SURGERY

## 2025-04-28 PROCEDURE — 96375 TX/PRO/DX INJ NEW DRUG ADDON: CPT | Mod: 59

## 2025-04-28 PROCEDURE — 2500000004 HC RX 250 GENERAL PHARMACY W/ HCPCS (ALT 636 FOR OP/ED): Performed by: SURGERY

## 2025-04-28 PROCEDURE — 7100000002 HC RECOVERY ROOM TIME - EACH INCREMENTAL 1 MINUTE: Performed by: SURGERY

## 2025-04-28 PROCEDURE — 3600000008 HC OR TIME - EACH INCREMENTAL 1 MINUTE - PROCEDURE LEVEL THREE: Performed by: SURGERY

## 2025-04-28 PROCEDURE — 85025 COMPLETE CBC W/AUTO DIFF WBC: CPT | Performed by: NURSE PRACTITIONER

## 2025-04-28 PROCEDURE — 2500000004 HC RX 250 GENERAL PHARMACY W/ HCPCS (ALT 636 FOR OP/ED): Performed by: NURSE PRACTITIONER

## 2025-04-28 PROCEDURE — 2500000001 HC RX 250 WO HCPCS SELF ADMINISTERED DRUGS (ALT 637 FOR MEDICARE OP): Performed by: STUDENT IN AN ORGANIZED HEALTH CARE EDUCATION/TRAINING PROGRAM

## 2025-04-28 PROCEDURE — 2720000007 HC OR 272 NO HCPCS: Performed by: SURGERY

## 2025-04-28 PROCEDURE — 3600000003 HC OR TIME - INITIAL BASE CHARGE - PROCEDURE LEVEL THREE: Performed by: SURGERY

## 2025-04-28 PROCEDURE — 99223 1ST HOSP IP/OBS HIGH 75: CPT | Performed by: NURSE PRACTITIONER

## 2025-04-28 PROCEDURE — 88304 TISSUE EXAM BY PATHOLOGIST: CPT | Performed by: PATHOLOGY

## 2025-04-28 PROCEDURE — 96361 HYDRATE IV INFUSION ADD-ON: CPT | Mod: 59

## 2025-04-28 PROCEDURE — 74177 CT ABD & PELVIS W/CONTRAST: CPT | Performed by: STUDENT IN AN ORGANIZED HEALTH CARE EDUCATION/TRAINING PROGRAM

## 2025-04-28 PROCEDURE — 2500000004 HC RX 250 GENERAL PHARMACY W/ HCPCS (ALT 636 FOR OP/ED): Mod: JZ | Performed by: NURSE ANESTHETIST, CERTIFIED REGISTERED

## 2025-04-28 PROCEDURE — 74177 CT ABD & PELVIS W/CONTRAST: CPT

## 2025-04-28 RX ORDER — SODIUM CHLORIDE 0.9 G/100ML
INJECTION, SOLUTION IRRIGATION AS NEEDED
Status: DISCONTINUED | OUTPATIENT
Start: 2025-04-28 | End: 2025-04-28 | Stop reason: HOSPADM

## 2025-04-28 RX ORDER — MORPHINE SULFATE 4 MG/ML
4 INJECTION INTRAVENOUS ONCE
Status: COMPLETED | OUTPATIENT
Start: 2025-04-28 | End: 2025-04-28

## 2025-04-28 RX ORDER — KETOROLAC TROMETHAMINE 30 MG/ML
15 INJECTION, SOLUTION INTRAMUSCULAR; INTRAVENOUS ONCE
Status: COMPLETED | OUTPATIENT
Start: 2025-04-28 | End: 2025-04-28

## 2025-04-28 RX ORDER — HYDRALAZINE HYDROCHLORIDE 20 MG/ML
5 INJECTION INTRAMUSCULAR; INTRAVENOUS EVERY 30 MIN PRN
Status: DISCONTINUED | OUTPATIENT
Start: 2025-04-28 | End: 2025-04-28 | Stop reason: HOSPADM

## 2025-04-28 RX ORDER — ALBUTEROL SULFATE 0.83 MG/ML
2.5 SOLUTION RESPIRATORY (INHALATION) ONCE AS NEEDED
Status: DISCONTINUED | OUTPATIENT
Start: 2025-04-28 | End: 2025-04-28 | Stop reason: HOSPADM

## 2025-04-28 RX ORDER — OXYCODONE AND ACETAMINOPHEN 5; 325 MG/1; MG/1
1 TABLET ORAL EVERY 4 HOURS PRN
Status: DISCONTINUED | OUTPATIENT
Start: 2025-04-28 | End: 2025-04-28 | Stop reason: HOSPADM

## 2025-04-28 RX ORDER — SODIUM CHLORIDE, SODIUM LACTATE, POTASSIUM CHLORIDE, CALCIUM CHLORIDE 600; 310; 30; 20 MG/100ML; MG/100ML; MG/100ML; MG/100ML
100 INJECTION, SOLUTION INTRAVENOUS CONTINUOUS
Status: ACTIVE | OUTPATIENT
Start: 2025-04-28 | End: 2025-04-29

## 2025-04-28 RX ORDER — ONDANSETRON HYDROCHLORIDE 2 MG/ML
INJECTION, SOLUTION INTRAVENOUS AS NEEDED
Status: DISCONTINUED | OUTPATIENT
Start: 2025-04-28 | End: 2025-04-28

## 2025-04-28 RX ORDER — ONDANSETRON HYDROCHLORIDE 2 MG/ML
4 INJECTION, SOLUTION INTRAVENOUS EVERY 8 HOURS PRN
Status: DISCONTINUED | OUTPATIENT
Start: 2025-04-28 | End: 2025-04-29 | Stop reason: HOSPADM

## 2025-04-28 RX ORDER — PANTOPRAZOLE SODIUM 40 MG/10ML
40 INJECTION, POWDER, LYOPHILIZED, FOR SOLUTION INTRAVENOUS
Status: DISCONTINUED | OUTPATIENT
Start: 2025-04-29 | End: 2025-04-29 | Stop reason: HOSPADM

## 2025-04-28 RX ORDER — BUPROPION HYDROCHLORIDE 150 MG/1
150 TABLET, EXTENDED RELEASE ORAL 2 TIMES DAILY
Status: DISCONTINUED | OUTPATIENT
Start: 2025-04-28 | End: 2025-04-29 | Stop reason: HOSPADM

## 2025-04-28 RX ORDER — MIDAZOLAM HYDROCHLORIDE 1 MG/ML
INJECTION, SOLUTION INTRAMUSCULAR; INTRAVENOUS AS NEEDED
Status: DISCONTINUED | OUTPATIENT
Start: 2025-04-28 | End: 2025-04-28

## 2025-04-28 RX ORDER — BISMUTH SUBSALICYLATE 262 MG
1 TABLET,CHEWABLE ORAL DAILY
COMMUNITY

## 2025-04-28 RX ORDER — SODIUM CHLORIDE, SODIUM LACTATE, POTASSIUM CHLORIDE, CALCIUM CHLORIDE 600; 310; 30; 20 MG/100ML; MG/100ML; MG/100ML; MG/100ML
75 INJECTION, SOLUTION INTRAVENOUS CONTINUOUS
Status: ACTIVE | OUTPATIENT
Start: 2025-04-28 | End: 2025-04-28

## 2025-04-28 RX ORDER — LIDOCAINE HCL/PF 100 MG/5ML
SYRINGE (ML) INTRAVENOUS AS NEEDED
Status: DISCONTINUED | OUTPATIENT
Start: 2025-04-28 | End: 2025-04-28

## 2025-04-28 RX ORDER — AMLODIPINE BESYLATE 10 MG/1
10 TABLET ORAL DAILY
Status: DISCONTINUED | OUTPATIENT
Start: 2025-04-28 | End: 2025-04-29 | Stop reason: HOSPADM

## 2025-04-28 RX ORDER — ACETAMINOPHEN 325 MG/1
650 TABLET ORAL EVERY 6 HOURS
Status: DISCONTINUED | OUTPATIENT
Start: 2025-04-28 | End: 2025-04-29 | Stop reason: HOSPADM

## 2025-04-28 RX ORDER — GABAPENTIN 300 MG/1
300 CAPSULE ORAL NIGHTLY
Status: DISCONTINUED | OUTPATIENT
Start: 2025-04-28 | End: 2025-04-29 | Stop reason: HOSPADM

## 2025-04-28 RX ORDER — LIDOCAINE HYDROCHLORIDE 10 MG/ML
0.1 INJECTION, SOLUTION EPIDURAL; INFILTRATION; INTRACAUDAL; PERINEURAL ONCE
Status: DISCONTINUED | OUTPATIENT
Start: 2025-04-28 | End: 2025-04-28 | Stop reason: HOSPADM

## 2025-04-28 RX ORDER — BUPIVACAINE HYDROCHLORIDE 2.5 MG/ML
INJECTION, SOLUTION EPIDURAL; INFILTRATION; INTRACAUDAL; PERINEURAL AS NEEDED
Status: DISCONTINUED | OUTPATIENT
Start: 2025-04-28 | End: 2025-04-28 | Stop reason: HOSPADM

## 2025-04-28 RX ORDER — ONDANSETRON HYDROCHLORIDE 2 MG/ML
4 INJECTION, SOLUTION INTRAVENOUS ONCE
Status: COMPLETED | OUTPATIENT
Start: 2025-04-28 | End: 2025-04-28

## 2025-04-28 RX ORDER — MORPHINE SULFATE 2 MG/ML
2 INJECTION, SOLUTION INTRAMUSCULAR; INTRAVENOUS EVERY 5 MIN PRN
Status: DISCONTINUED | OUTPATIENT
Start: 2025-04-28 | End: 2025-04-28 | Stop reason: HOSPADM

## 2025-04-28 RX ORDER — TALC
3 POWDER (GRAM) TOPICAL NIGHTLY PRN
Status: DISCONTINUED | OUTPATIENT
Start: 2025-04-28 | End: 2025-04-29 | Stop reason: HOSPADM

## 2025-04-28 RX ORDER — BISACODYL 5 MG
10 TABLET, DELAYED RELEASE (ENTERIC COATED) ORAL DAILY PRN
Status: DISCONTINUED | OUTPATIENT
Start: 2025-04-28 | End: 2025-04-29 | Stop reason: HOSPADM

## 2025-04-28 RX ORDER — ONDANSETRON HYDROCHLORIDE 2 MG/ML
4 INJECTION, SOLUTION INTRAVENOUS ONCE AS NEEDED
Status: DISCONTINUED | OUTPATIENT
Start: 2025-04-28 | End: 2025-04-28 | Stop reason: HOSPADM

## 2025-04-28 RX ORDER — BISACODYL 10 MG/1
10 SUPPOSITORY RECTAL DAILY PRN
Status: DISCONTINUED | OUTPATIENT
Start: 2025-04-28 | End: 2025-04-29 | Stop reason: HOSPADM

## 2025-04-28 RX ORDER — ROCURONIUM BROMIDE 10 MG/ML
INJECTION, SOLUTION INTRAVENOUS AS NEEDED
Status: DISCONTINUED | OUTPATIENT
Start: 2025-04-28 | End: 2025-04-28

## 2025-04-28 RX ORDER — PANTOPRAZOLE SODIUM 40 MG/1
40 TABLET, DELAYED RELEASE ORAL
Status: DISCONTINUED | OUTPATIENT
Start: 2025-04-29 | End: 2025-04-29 | Stop reason: HOSPADM

## 2025-04-28 RX ORDER — FENTANYL CITRATE 50 UG/ML
INJECTION, SOLUTION INTRAMUSCULAR; INTRAVENOUS AS NEEDED
Status: DISCONTINUED | OUTPATIENT
Start: 2025-04-28 | End: 2025-04-28

## 2025-04-28 RX ORDER — OXYCODONE HYDROCHLORIDE 5 MG/1
5 TABLET ORAL EVERY 6 HOURS PRN
Status: DISCONTINUED | OUTPATIENT
Start: 2025-04-28 | End: 2025-04-29 | Stop reason: HOSPADM

## 2025-04-28 RX ORDER — MORPHINE SULFATE 4 MG/ML
4 INJECTION INTRAVENOUS EVERY 4 HOURS PRN
Status: DISCONTINUED | OUTPATIENT
Start: 2025-04-28 | End: 2025-04-29 | Stop reason: HOSPADM

## 2025-04-28 RX ORDER — PHENYLEPHRINE HCL IN 0.9% NACL 0.4MG/10ML
SYRINGE (ML) INTRAVENOUS AS NEEDED
Status: DISCONTINUED | OUTPATIENT
Start: 2025-04-28 | End: 2025-04-28

## 2025-04-28 RX ORDER — ONDANSETRON 4 MG/1
4 TABLET, FILM COATED ORAL EVERY 8 HOURS PRN
Status: DISCONTINUED | OUTPATIENT
Start: 2025-04-28 | End: 2025-04-29 | Stop reason: HOSPADM

## 2025-04-28 RX ORDER — DULOXETIN HYDROCHLORIDE 30 MG/1
60 CAPSULE, DELAYED RELEASE ORAL DAILY
Status: DISCONTINUED | OUTPATIENT
Start: 2025-04-28 | End: 2025-04-29 | Stop reason: HOSPADM

## 2025-04-28 RX ORDER — DIPHENHYDRAMINE HYDROCHLORIDE 50 MG/ML
12.5 INJECTION, SOLUTION INTRAMUSCULAR; INTRAVENOUS ONCE AS NEEDED
Status: DISCONTINUED | OUTPATIENT
Start: 2025-04-28 | End: 2025-04-28 | Stop reason: HOSPADM

## 2025-04-28 RX ORDER — ENOXAPARIN SODIUM 100 MG/ML
40 INJECTION SUBCUTANEOUS EVERY 24 HOURS
Status: DISCONTINUED | OUTPATIENT
Start: 2025-04-28 | End: 2025-04-29 | Stop reason: HOSPADM

## 2025-04-28 RX ORDER — PROPOFOL 10 MG/ML
INJECTION, EMULSION INTRAVENOUS AS NEEDED
Status: DISCONTINUED | OUTPATIENT
Start: 2025-04-28 | End: 2025-04-28

## 2025-04-28 RX ORDER — DROPERIDOL 2.5 MG/ML
0.62 INJECTION, SOLUTION INTRAMUSCULAR; INTRAVENOUS ONCE AS NEEDED
Status: DISCONTINUED | OUTPATIENT
Start: 2025-04-28 | End: 2025-04-28 | Stop reason: HOSPADM

## 2025-04-28 RX ORDER — LABETALOL HYDROCHLORIDE 5 MG/ML
5 INJECTION, SOLUTION INTRAVENOUS ONCE AS NEEDED
Status: DISCONTINUED | OUTPATIENT
Start: 2025-04-28 | End: 2025-04-28 | Stop reason: HOSPADM

## 2025-04-28 RX ORDER — HYDROMORPHONE HYDROCHLORIDE 0.2 MG/ML
0.2 INJECTION INTRAMUSCULAR; INTRAVENOUS; SUBCUTANEOUS
Status: DISCONTINUED | OUTPATIENT
Start: 2025-04-28 | End: 2025-04-29 | Stop reason: HOSPADM

## 2025-04-28 RX ORDER — FAMOTIDINE 10 MG/ML
20 INJECTION, SOLUTION INTRAVENOUS ONCE
Status: COMPLETED | OUTPATIENT
Start: 2025-04-28 | End: 2025-04-28

## 2025-04-28 RX ADMIN — ONDANSETRON 4 MG: 2 INJECTION INTRAMUSCULAR; INTRAVENOUS at 16:48

## 2025-04-28 RX ADMIN — FENTANYL CITRATE 25 MCG: 50 INJECTION INTRAMUSCULAR; INTRAVENOUS at 17:30

## 2025-04-28 RX ADMIN — LIDOCAINE HYDROCHLORIDE 100 MG: 20 INJECTION INTRAVENOUS at 16:37

## 2025-04-28 RX ADMIN — MORPHINE SULFATE 4 MG: 4 INJECTION INTRAVENOUS at 13:48

## 2025-04-28 RX ADMIN — ACETAMINOPHEN 650 MG: 325 TABLET ORAL at 19:31

## 2025-04-28 RX ADMIN — MIDAZOLAM 1 MG: 1 INJECTION INTRAMUSCULAR; INTRAVENOUS at 16:25

## 2025-04-28 RX ADMIN — FENTANYL CITRATE 25 MCG: 50 INJECTION INTRAMUSCULAR; INTRAVENOUS at 17:32

## 2025-04-28 RX ADMIN — SUGAMMADEX 200 MG: 100 INJECTION, SOLUTION INTRAVENOUS at 17:27

## 2025-04-28 RX ADMIN — PROPOFOL 50 MG: 10 INJECTION, EMULSION INTRAVENOUS at 17:31

## 2025-04-28 RX ADMIN — ROCURONIUM BROMIDE 50 MG: 10 INJECTION, SOLUTION INTRAVENOUS at 16:37

## 2025-04-28 RX ADMIN — FENTANYL CITRATE 50 MCG: 50 INJECTION INTRAMUSCULAR; INTRAVENOUS at 16:37

## 2025-04-28 RX ADMIN — KETOROLAC TROMETHAMINE 15 MG: 30 INJECTION, SOLUTION INTRAMUSCULAR at 09:00

## 2025-04-28 RX ADMIN — PIPERACILLIN SODIUM AND TAZOBACTAM SODIUM 3.38 G: 3; .375 INJECTION, SOLUTION INTRAVENOUS at 16:24

## 2025-04-28 RX ADMIN — DEXAMETHASONE SODIUM PHOSPHATE 8 MG: 4 INJECTION INTRA-ARTICULAR; INTRALESIONAL; INTRAMUSCULAR; INTRAVENOUS; SOFT TISSUE at 16:48

## 2025-04-28 RX ADMIN — SODIUM CHLORIDE, SODIUM LACTATE, POTASSIUM CHLORIDE, AND CALCIUM CHLORIDE 100 ML/HR: .6; .31; .03; .02 INJECTION, SOLUTION INTRAVENOUS at 13:48

## 2025-04-28 RX ADMIN — FAMOTIDINE 20 MG: 10 INJECTION, SOLUTION INTRAVENOUS at 16:00

## 2025-04-28 RX ADMIN — SODIUM CHLORIDE, SODIUM LACTATE, POTASSIUM CHLORIDE, AND CALCIUM CHLORIDE 100 ML/HR: .6; .31; .03; .02 INJECTION, SOLUTION INTRAVENOUS at 12:26

## 2025-04-28 RX ADMIN — BUPROPION HYDROCHLORIDE 150 MG: 150 TABLET, FILM COATED, EXTENDED RELEASE ORAL at 20:44

## 2025-04-28 RX ADMIN — ONDANSETRON 4 MG: 2 INJECTION INTRAMUSCULAR; INTRAVENOUS at 09:00

## 2025-04-28 RX ADMIN — PROPOFOL 140 MG: 10 INJECTION, EMULSION INTRAVENOUS at 16:37

## 2025-04-28 RX ADMIN — SODIUM CHLORIDE, SODIUM LACTATE, POTASSIUM CHLORIDE, AND CALCIUM CHLORIDE 100 ML/HR: .6; .31; .03; .02 INJECTION, SOLUTION INTRAVENOUS at 18:32

## 2025-04-28 RX ADMIN — SODIUM CHLORIDE 1000 ML: 0.9 INJECTION, SOLUTION INTRAVENOUS at 09:01

## 2025-04-28 RX ADMIN — MORPHINE SULFATE 4 MG: 4 INJECTION INTRAVENOUS at 09:45

## 2025-04-28 RX ADMIN — GABAPENTIN 300 MG: 300 CAPSULE ORAL at 20:44

## 2025-04-28 RX ADMIN — IOHEXOL 75 ML: 350 INJECTION, SOLUTION INTRAVENOUS at 10:23

## 2025-04-28 RX ADMIN — PIPERACILLIN SODIUM AND TAZOBACTAM SODIUM 3.38 G: 3; .375 INJECTION, SOLUTION INTRAVENOUS at 12:26

## 2025-04-28 RX ADMIN — Medication 100 MCG: at 16:44

## 2025-04-28 RX ADMIN — Medication 100 MCG: at 16:49

## 2025-04-28 RX ADMIN — Medication 100 MCG: at 16:50

## 2025-04-28 RX ADMIN — SODIUM CHLORIDE, SODIUM LACTATE, POTASSIUM CHLORIDE, AND CALCIUM CHLORIDE 500 ML: .6; .31; .03; .02 INJECTION, SOLUTION INTRAVENOUS at 18:59

## 2025-04-28 SDOH — ECONOMIC STABILITY: HOUSING INSECURITY: IN THE PAST 12 MONTHS, HOW MANY TIMES HAVE YOU MOVED WHERE YOU WERE LIVING?: 1

## 2025-04-28 SDOH — ECONOMIC STABILITY: FOOD INSECURITY: WITHIN THE PAST 12 MONTHS, THE FOOD YOU BOUGHT JUST DIDN'T LAST AND YOU DIDN'T HAVE MONEY TO GET MORE.: NEVER TRUE

## 2025-04-28 SDOH — ECONOMIC STABILITY: FOOD INSECURITY: WITHIN THE PAST 12 MONTHS, YOU WORRIED THAT YOUR FOOD WOULD RUN OUT BEFORE YOU GOT THE MONEY TO BUY MORE.: NEVER TRUE

## 2025-04-28 SDOH — ECONOMIC STABILITY: INCOME INSECURITY: IN THE PAST 12 MONTHS HAS THE ELECTRIC, GAS, OIL, OR WATER COMPANY THREATENED TO SHUT OFF SERVICES IN YOUR HOME?: NO

## 2025-04-28 SDOH — ECONOMIC STABILITY: HOUSING INSECURITY: IN THE LAST 12 MONTHS, WAS THERE A TIME WHEN YOU WERE NOT ABLE TO PAY THE MORTGAGE OR RENT ON TIME?: NO

## 2025-04-28 SDOH — SOCIAL STABILITY: SOCIAL INSECURITY
WITHIN THE LAST YEAR, HAVE YOU BEEN RAPED OR FORCED TO HAVE ANY KIND OF SEXUAL ACTIVITY BY YOUR PARTNER OR EX-PARTNER?: NO

## 2025-04-28 SDOH — SOCIAL STABILITY: SOCIAL INSECURITY: HAVE YOU HAD THOUGHTS OF HARMING ANYONE ELSE?: NO

## 2025-04-28 SDOH — SOCIAL STABILITY: SOCIAL INSECURITY: WITHIN THE LAST YEAR, HAVE YOU BEEN HUMILIATED OR EMOTIONALLY ABUSED IN OTHER WAYS BY YOUR PARTNER OR EX-PARTNER?: NO

## 2025-04-28 SDOH — ECONOMIC STABILITY: FOOD INSECURITY: HOW HARD IS IT FOR YOU TO PAY FOR THE VERY BASICS LIKE FOOD, HOUSING, MEDICAL CARE, AND HEATING?: NOT VERY HARD

## 2025-04-28 SDOH — HEALTH STABILITY: MENTAL HEALTH: CURRENT SMOKER: 0

## 2025-04-28 SDOH — SOCIAL STABILITY: SOCIAL INSECURITY: WITHIN THE LAST YEAR, HAVE YOU BEEN AFRAID OF YOUR PARTNER OR EX-PARTNER?: NO

## 2025-04-28 SDOH — SOCIAL STABILITY: SOCIAL NETWORK
DO YOU BELONG TO ANY CLUBS OR ORGANIZATIONS SUCH AS CHURCH GROUPS, UNIONS, FRATERNAL OR ATHLETIC GROUPS, OR SCHOOL GROUPS?: NO

## 2025-04-28 SDOH — ECONOMIC STABILITY: HOUSING INSECURITY: AT ANY TIME IN THE PAST 12 MONTHS, WERE YOU HOMELESS OR LIVING IN A SHELTER (INCLUDING NOW)?: NO

## 2025-04-28 SDOH — SOCIAL STABILITY: SOCIAL NETWORK: HOW OFTEN DO YOU ATTEND MEETINGS OF THE CLUBS OR ORGANIZATIONS YOU BELONG TO?: NEVER

## 2025-04-28 SDOH — HEALTH STABILITY: MENTAL HEALTH
DO YOU FEEL STRESS - TENSE, RESTLESS, NERVOUS, OR ANXIOUS, OR UNABLE TO SLEEP AT NIGHT BECAUSE YOUR MIND IS TROUBLED ALL THE TIME - THESE DAYS?: NOT AT ALL

## 2025-04-28 SDOH — SOCIAL STABILITY: SOCIAL INSECURITY
WITHIN THE LAST YEAR, HAVE YOU BEEN KICKED, HIT, SLAPPED, OR OTHERWISE PHYSICALLY HURT BY YOUR PARTNER OR EX-PARTNER?: NO

## 2025-04-28 SDOH — SOCIAL STABILITY: SOCIAL NETWORK: HOW OFTEN DO YOU ATTEND CHURCH OR RELIGIOUS SERVICES?: 1 TO 4 TIMES PER YEAR

## 2025-04-28 SDOH — SOCIAL STABILITY: SOCIAL NETWORK: IN A TYPICAL WEEK, HOW MANY TIMES DO YOU TALK ON THE PHONE WITH FAMILY, FRIENDS, OR NEIGHBORS?: TWICE A WEEK

## 2025-04-28 SDOH — SOCIAL STABILITY: SOCIAL NETWORK: HOW OFTEN DO YOU GET TOGETHER WITH FRIENDS OR RELATIVES?: TWICE A WEEK

## 2025-04-28 SDOH — ECONOMIC STABILITY: TRANSPORTATION INSECURITY: IN THE PAST 12 MONTHS, HAS LACK OF TRANSPORTATION KEPT YOU FROM MEDICAL APPOINTMENTS OR FROM GETTING MEDICATIONS?: NO

## 2025-04-28 SDOH — HEALTH STABILITY: PHYSICAL HEALTH: ON AVERAGE, HOW MANY MINUTES DO YOU ENGAGE IN EXERCISE AT THIS LEVEL?: 30 MIN

## 2025-04-28 SDOH — HEALTH STABILITY: PHYSICAL HEALTH
HOW OFTEN DO YOU NEED TO HAVE SOMEONE HELP YOU WHEN YOU READ INSTRUCTIONS, PAMPHLETS, OR OTHER WRITTEN MATERIAL FROM YOUR DOCTOR OR PHARMACY?: NEVER

## 2025-04-28 SDOH — SOCIAL STABILITY: SOCIAL INSECURITY: ARE YOU MARRIED, WIDOWED, DIVORCED, SEPARATED, NEVER MARRIED, OR LIVING WITH A PARTNER?: MARRIED

## 2025-04-28 SDOH — HEALTH STABILITY: PHYSICAL HEALTH: ON AVERAGE, HOW MANY DAYS PER WEEK DO YOU ENGAGE IN MODERATE TO STRENUOUS EXERCISE (LIKE A BRISK WALK)?: 3 DAYS

## 2025-04-28 SDOH — SOCIAL STABILITY: SOCIAL INSECURITY: WERE YOU ABLE TO COMPLETE ALL THE BEHAVIORAL HEALTH SCREENINGS?: YES

## 2025-04-28 SDOH — SOCIAL STABILITY: SOCIAL INSECURITY: ABUSE: ADULT

## 2025-04-28 ASSESSMENT — COGNITIVE AND FUNCTIONAL STATUS - GENERAL
CLIMB 3 TO 5 STEPS WITH RAILING: A LITTLE
DAILY ACTIVITIY SCORE: 24
MOBILITY SCORE: 24
MOBILITY SCORE: 23
DAILY ACTIVITIY SCORE: 24
PATIENT BASELINE BEDBOUND: NO

## 2025-04-28 ASSESSMENT — LIFESTYLE VARIABLES
HOW OFTEN DO YOU HAVE A DRINK CONTAINING ALCOHOL: NEVER
HAVE YOU EVER FELT YOU SHOULD CUT DOWN ON YOUR DRINKING: NO
SKIP TO QUESTIONS 9-10: 1
EVER HAD A DRINK FIRST THING IN THE MORNING TO STEADY YOUR NERVES TO GET RID OF A HANGOVER: NO
AUDIT-C TOTAL SCORE: 0
HAVE PEOPLE ANNOYED YOU BY CRITICIZING YOUR DRINKING: NO
HOW MANY STANDARD DRINKS CONTAINING ALCOHOL DO YOU HAVE ON A TYPICAL DAY: PATIENT DOES NOT DRINK
HOW OFTEN DO YOU HAVE 6 OR MORE DRINKS ON ONE OCCASION: NEVER
AUDIT-C TOTAL SCORE: 0
TOTAL SCORE: 0
EVER FELT BAD OR GUILTY ABOUT YOUR DRINKING: NO

## 2025-04-28 ASSESSMENT — PAIN - FUNCTIONAL ASSESSMENT
PAIN_FUNCTIONAL_ASSESSMENT: 0-10

## 2025-04-28 ASSESSMENT — PAIN SCALES - GENERAL
PAINLEVEL_OUTOF10: 7
PAINLEVEL_OUTOF10: 10 - WORST POSSIBLE PAIN
PAINLEVEL_OUTOF10: 8
PAINLEVEL_OUTOF10: 0 - NO PAIN
PAINLEVEL_OUTOF10: 2
PAINLEVEL_OUTOF10: 0 - NO PAIN
PAIN_LEVEL: 0
PAINLEVEL_OUTOF10: 0 - NO PAIN
PAINLEVEL_OUTOF10: 0 - NO PAIN
PAINLEVEL_OUTOF10: 3
PAINLEVEL_OUTOF10: 10 - WORST POSSIBLE PAIN

## 2025-04-28 ASSESSMENT — ENCOUNTER SYMPTOMS
VOMITING: 0
COLOR CHANGE: 0
AGITATION: 0
DYSURIA: 0
ABDOMINAL DISTENTION: 0
DIARRHEA: 0
CHEST TIGHTNESS: 0
APPETITE CHANGE: 1
ABDOMINAL PAIN: 1
NAUSEA: 1
ACTIVITY CHANGE: 1
NAUSEA: 0
DIZZINESS: 0

## 2025-04-28 ASSESSMENT — ACTIVITIES OF DAILY LIVING (ADL)
LACK_OF_TRANSPORTATION: NO
BATHING: INDEPENDENT
PATIENT'S MEMORY ADEQUATE TO SAFELY COMPLETE DAILY ACTIVITIES?: YES
WALKS IN HOME: INDEPENDENT
ADEQUATE_TO_COMPLETE_ADL: YES
FEEDING YOURSELF: INDEPENDENT
HEARING - LEFT EAR: FUNCTIONAL
TOILETING: INDEPENDENT
HEARING - RIGHT EAR: FUNCTIONAL
DRESSING YOURSELF: INDEPENDENT
GROOMING: INDEPENDENT
JUDGMENT_ADEQUATE_SAFELY_COMPLETE_DAILY_ACTIVITIES: YES

## 2025-04-28 ASSESSMENT — PATIENT HEALTH QUESTIONNAIRE - PHQ9
1. LITTLE INTEREST OR PLEASURE IN DOING THINGS: NOT AT ALL
SUM OF ALL RESPONSES TO PHQ9 QUESTIONS 1 & 2: 0
2. FEELING DOWN, DEPRESSED OR HOPELESS: NOT AT ALL

## 2025-04-28 ASSESSMENT — COLUMBIA-SUICIDE SEVERITY RATING SCALE - C-SSRS
2. HAVE YOU ACTUALLY HAD ANY THOUGHTS OF KILLING YOURSELF?: NO
1. IN THE PAST MONTH, HAVE YOU WISHED YOU WERE DEAD OR WISHED YOU COULD GO TO SLEEP AND NOT WAKE UP?: NO
6. HAVE YOU EVER DONE ANYTHING, STARTED TO DO ANYTHING, OR PREPARED TO DO ANYTHING TO END YOUR LIFE?: NO

## 2025-04-28 ASSESSMENT — PAIN DESCRIPTION - LOCATION
LOCATION: ABDOMEN
LOCATION: ABDOMEN

## 2025-04-28 ASSESSMENT — PAIN DESCRIPTION - DESCRIPTORS: DESCRIPTORS: ACHING

## 2025-04-28 ASSESSMENT — PAIN DESCRIPTION - PAIN TYPE: TYPE: ACUTE PAIN

## 2025-04-28 ASSESSMENT — PAIN DESCRIPTION - ORIENTATION: ORIENTATION: LOWER

## 2025-04-28 NOTE — PROGRESS NOTES
"Laurie Fermin \"Shaina\" is a 70 y.o. female admitted for Appendicitis. Pharmacy reviewed the patient's [id number] medications and allergies for accuracy.    The list below reflects the PTA list prior to pharmacy medication history. A summary a changes to the PTA medication list has been listed below. Please review each medication in order reconciliation for additional clarification and justification.    Source of information:  T2P    Medications added:  CALCIUM 500MG 1 every day  VITAMIN B12 50 1 QD  Medications modified:  ALENDRONATE 70MG 1 Q WEEK ----------->ON MONDAYS    Medications to be removed:    Medications of concern:      Prior to Admission Medications   Prescriptions Last Dose Informant Patient Reported? Taking?   DULoxetine (Cymbalta) 60 mg DR capsule   No No   Sig: Take 1 capsule (60 mg) by mouth once daily.   alendronate (Fosamax) 70 mg tablet   No No   Sig: Take 1 tablet (70 mg) by mouth every 7 days.   amLODIPine (Norvasc) 10 mg tablet   No No   Sig: Take 1 tablet (10 mg) by mouth once daily.   buPROPion SR (Wellbutrin SR) 150 mg 12 hr tablet   No No   Sig: Take 1 tablet (150 mg) by mouth every 12 hours.   gabapentin (Neurontin) 300 mg capsule   No No   Sig: Take 1 capsule (300 mg) by mouth once daily at bedtime.      Facility-Administered Medications: None       Elida Hernandez"

## 2025-04-28 NOTE — PROGRESS NOTES
"Occupational Therapy                 Therapy Communication Note    Patient Name: Laurie Fermin \"Shaina\"  MRN: [id number]  Department: [facility] 3 E  Room: [address] [place], [place] [address]  Today's Date: 4/28/2025     Discipline: Occupational Therapy    The patient was screened for OT services. Chart reviewed and spoke with pt in her room. Pt is pending surgery: lap appendectomy. Rn reports pt leaving for OR within the half hour. Pt has been mobilizing in room independently and patient denies self care concerns at this time. No skilled OT needs currently. Will DC. Consider reordered if patient status changes post op.  "

## 2025-04-28 NOTE — CARE PLAN
The patient's goals for the shift include      The clinical goals for the shift include pain < 3/10    Problem: Pain - Adult  Goal: Verbalizes/displays adequate comfort level or baseline comfort level  Outcome: Progressing     Problem: Safety - Adult  Goal: Free from fall injury  Outcome: Progressing     Problem: Discharge Planning  Goal: Discharge to home or other facility with appropriate resources  Outcome: Progressing     Problem: Chronic Conditions and Co-morbidities  Goal: Patient's chronic conditions and co-morbidity symptoms are monitored and maintained or improved  Outcome: Progressing     Problem: Nutrition  Goal: Nutrient intake appropriate for maintaining nutritional needs  Outcome: Progressing     Problem: Pain  Goal: Takes deep breaths with improved pain control throughout the shift  Outcome: Progressing  Goal: Turns in bed with improved pain control throughout the shift  Outcome: Progressing  Goal: Walks with improved pain control throughout the shift  Outcome: Progressing  Goal: Performs ADL's with improved pain control throughout shift  Outcome: Progressing  Goal: Participates in PT with improved pain control throughout the shift  Outcome: Progressing  Goal: Free from opioid side effects throughout the shift  Outcome: Progressing  Goal: Free from acute confusion related to pain meds throughout the shift  Outcome: Progressing     Problem: Skin  Goal: Promote skin healing  Outcome: Progressing  Flowsheets (Taken 4/28/2025 1327)  Promote skin healing:   Assess skin/pad under line(s)/device(s)   Protective dressings over bony prominences   Turn/reposition every 2 hours/use positioning/transfer devices     Problem: Fall/Injury  Goal: Not fall by end of shift  Outcome: Progressing  Goal: Be free from injury by end of the shift  Outcome: Progressing  Goal: Verbalize understanding of personal risk factors for fall in the hospital  Outcome: Progressing  Goal: Verbalize understanding of risk factor reduction  measures to prevent injury from fall in the home  Outcome: Progressing  Goal: Use assistive devices by end of the shift  Outcome: Progressing  Goal: Pace activities to prevent fatigue by end of the shift  Outcome: Progressing

## 2025-04-28 NOTE — H&P
"[facility] - GENERAL MEDICINE HISTORY AND PHYSICAL    HISTORY OF PRESENT ILLNESS     History Obtained From (Primary Source): The patient  Collateral History (Secondary Sources): D/w ED    History Of Present Illness (HPI):  Laurie Fermin \"Shaina\" is a 70 y.o. female with PMHx s/f hypertension presenting with abdominal pain nausea.  Patient presented after developing abdominal pain across the lower abdomen.  The pain was sharp and it been fairly constant since last evening with some associated nausea.  Patient was given IV pain medicine with improvement of her pain and nausea.  In the ED she had CT scan suggestive of early appendicitis with mild leukocytosis.  The patient denies anorexia fevers chills rigors diarrhea constipation.  Patient has not had similar symptoms in the past.  She has not been having any chest pain shortness of breath dizziness lightheadedness orthopnea lower extremity edema.  She recently had a stress echocardiogram that was normal.  Case was discussed with the ED provider patient will be admitted for her abdominal pain and acute pancreatitis.    ED Course:   Vitals on presentation: T 36.7 °C (98.1 °F)  HR 85  /77  RR 15  O2 99 % None (Room air)  Labs:   CBC with WBC 13.4, Hgb 13.9, Plts 255.   CMP with glucose 123, Na 138, K 4.2, BUN 15, sCr 0.87, alk phos 100, ALT 14, AST 18, bilirubin 0.5. Magnesium 1.93.   BNP No results found for requested labs within last 365 days.. Trop 4.   Lactate 1.7  EKG:   Imaging - agree with radiology interpretation(s):   Interventions:     12-point ROS reviewed and found to be negative aside from aforementioned positives in HPI and/or noted in dedicated ROS section below.     Decision made to admit the patient to the hospitalist service after evaluation of the patient, review of the above, and discussion with ED provider.     LABS AND IMAGING     I have personally reviewed the following labs from 04/28/25: CBC and CMP  I have personally " reviewed any obtained EKGs on 04/28/25, with my interpretation as listed above in the ED summary course.   I have personally reviewed the patient's vitals on presentation to the ED and any/all changes through to time of admission (on 04/28/25).     ED Course (From ED Provider):  ED Course as of 04/28/25 1209   Mon Apr 28, 2025   0941 Pain was initially improved with Toradol , but has now worsened. Morphine ordered.  [JZ]   1115 Radiology previously notified me of the early appendicitis noted on the CT. Reached out to Dr. Almanza, surgeon on call, who agrees to see the patient on consult. Patient aware and in agreement with the plan.  [JZ]      ED Course User Index  [JZ] Kwame Verma, APRN-CNP         Diagnoses as of 04/28/25 1209   Acute appendicitis, unspecified acute appendicitis type     Relevant Results  Results for orders placed or performed during the hospital encounter of 04/28/25 (from the past 24 hours)   CBC and Auto Differential   Result Value Ref Range    WBC 13.4 (H) 4.4 - 11.3 x10*3/uL    nRBC 0.0 0.0 - 0.0 /100 WBCs    RBC 4.79 4.00 - 5.20 x10*6/uL    Hemoglobin 13.9 12.0 - 16.0 g/dL    Hematocrit 42.5 36.0 - 46.0 %    MCV 89 80 - 100 fL    MCH 29.0 26.0 - 34.0 pg    MCHC 32.7 32.0 - 36.0 g/dL    RDW 13.0 11.5 - 14.5 %    Platelets 255 150 - 450 x10*3/uL    Neutrophils % 81.1 40.0 - 80.0 %    Immature Granulocytes %, Automated 0.3 0.0 - 0.9 %    Lymphocytes % 12.4 13.0 - 44.0 %    Monocytes % 5.7 2.0 - 10.0 %    Eosinophils % 0.1 0.0 - 6.0 %    Basophils % 0.4 0.0 - 2.0 %    Neutrophils Absolute 10.84 (H) 1.20 - 7.70 x10*3/uL    Immature Granulocytes Absolute, Automated 0.04 0.00 - 0.70 x10*3/uL    Lymphocytes Absolute 1.66 1.20 - 4.80 x10*3/uL    Monocytes Absolute 0.76 0.10 - 1.00 x10*3/uL    Eosinophils Absolute 0.02 0.00 - 0.70 x10*3/uL    Basophils Absolute 0.06 0.00 - 0.10 x10*3/uL   Comprehensive metabolic panel   Result Value Ref Range    Glucose 123 (H) 74 - 99 mg/dL    Sodium 138 136 - 145  mmol/L    Potassium 4.2 3.5 - 5.3 mmol/L    Chloride 102 98 - 107 mmol/L    Bicarbonate 26 21 - 32 mmol/L    Anion Gap 14 10 - 20 mmol/L    Urea Nitrogen 15 6 - 23 mg/dL    Creatinine 0.87 0.50 - 1.05 mg/dL    eGFR 72 >60 mL/min/1.73m*2    Calcium 9.8 8.6 - 10.3 mg/dL    Albumin 4.3 3.4 - 5.0 g/dL    Alkaline Phosphatase 100 33 - 136 U/L    Total Protein 7.3 6.4 - 8.2 g/dL    AST 18 9 - 39 U/L    Bilirubin, Total 0.5 0.0 - 1.2 mg/dL    ALT 14 7 - 45 U/L   Lactate   Result Value Ref Range    Lactate 1.7 0.4 - 2.0 mmol/L   Urinalysis with Reflex Culture and Microscopic   Result Value Ref Range    Color, Urine Light-Yellow Light-Yellow, Yellow, Dark-Yellow    Appearance, Urine Clear Clear    Specific Gravity, Urine 1.025 1.005 - 1.035    pH, Urine 8.0 5.0, 5.5, 6.0, 6.5, 7.0, 7.5, 8.0    Protein, Urine NEGATIVE NEGATIVE, 10 (TRACE), 20 (TRACE) mg/dL    Glucose, Urine Normal Normal mg/dL    Blood, Urine NEGATIVE NEGATIVE mg/dL    Ketones, Urine 10 (1+) (A) NEGATIVE mg/dL    Bilirubin, Urine NEGATIVE NEGATIVE mg/dL    Urobilinogen, Urine Normal Normal mg/dL    Nitrite, Urine NEGATIVE NEGATIVE    Leukocyte Esterase, Urine NEGATIVE NEGATIVE      Imaging  CT abdomen pelvis w IV contrast  Result Date: 4/28/2025  1. Mural thickening of the appendix measuring up to 1.2 cm in diameter without adjacent inflammatory change. The need for close follow-up which may include repeat CT of the abdomen and pelvis can be determined on a clinical basis. This finding was communicated with the ordering provider by myself at the time of dictation. 2. No bowel obstruction. 3. Age-indeterminate T11 vertebral body compression fracture. Correlation with point tenderness and consideration of further evaluation with MRI of the thoracic spine is recommended. 4. Additional chronic and/or ancillary findings detailed above.   MACRO: None   Signed by: Baljinder Lawrence 4/28/2025 10:52 AM Dictation workstation:   [id number]      Cardiology, Vascular, and  Other Imaging  No other imaging results found for the past 2 days       PAST HISTORIES AND ALLERGIES     Past Medical History  She has a past medical history of Other specified health status.    Surgical History  She has a past surgical history that includes Other surgical history (06/13/2022).     Social History  She reports that she has never smoked. She has never used smokeless tobacco. She reports that she does not drink alcohol and does not use drugs.    Family History  Family History[1]    Allergies  Patient has no known allergies.    MEDICATIONS     Scheduled Medications:  Scheduled Medications[2]  Continuous Medications:  Continuous Medications[3]  PRN Medications:  PRN Medications[4]     REVIEW OF SYSTEMS     Review of Systems   Gastrointestinal:  Positive for abdominal pain and nausea.       OBJECTIVE     Last Recorded Vitals  /72   Pulse 85   Temp 36.7 °C (98.1 °F)   Resp 15   Wt 74.8 kg (165 lb)   SpO2 99%      Physical Exam:  Vital signs and nursing notes reviewed.   Constitutional: Pleasant and cooperative. Laying in bed in no acute distress. Conversant.   Skin: Warm and dry; no obvious lesions, rashes, pallor, or jaundice.   Eyes: EOMI. Anicteric sclera.   ENT: Mucous membranes moist; no obvious injury or deformity appreciated.   Head and Neck: Normocephalic, atraumatic. ROM preserved. Trachea midline. No appreciable JVD.   Respiratory: Nonlabored on room air. Lungs clear to auscultation bilaterally without obvious adventitious sounds. Chest rise is equal.  Cardiovascular: RRR. No gross murmur, gallop, or rub. Extremities are warm and well-perfused with good capillary refill (< 3 seconds). No chest wall tenderness.   GI: Abdomen soft, generalized tenderness upper abdomen no rebound no guarding, nondistended. No obvious organomegaly appreciated. Bowel sounds are present.  : No CVA tenderness.   MSK: No gross abnormalities appreciated. No limitations to AROM/PROM appreciated.   Extremities:  No cyanosis, edema, or clubbing evident. Neurovascularly intact.   Neuro: A&Ox3. CN 2-12 grossly intact. Able to respond to questions appropriately and clearly. No acute focal neurologic deficits appreciated.  Psych: Appropriate mood and behavior.    ASSESSMENT AND PLAN   Assessment/Plan     70 y.o. female with PMHx s/f ypertension presenting with abdominal pain nausea.  Patient presented after developing abdominal pain across the lower abdomen.  The pain was sharp and it been fairly constant since last evening with some associated nausea.    Appendicitis  Patient n.p.o. will be given IV hydration  As needed morphine for pain and Zofran for nausea  Consult to acute care surgery    Hypertension  Patient's blood pressure typically well-controlled  Patient with recent stress echocardiogram showing normal EF no evidence of inducible ischemia               Sinan Carmona, APRN-CNP    Dragon dictation software was used to dictate this note and thus there may be minor errors in translation/transcription including garbled speech or misspellings. Please contact for clarification if needed.       [1]   Family History  Problem Relation Name Age of Onset    Colon cancer Mother      Lung cancer Mother      Colon cancer Other grandparent     Uterine cancer Other grandparent     Stroke Other grandparent    [2] [3] [4]

## 2025-04-28 NOTE — CONSULTS
[facility]  ACUTE CARE SURGERY - CONSULT    Patient Name: Laruie Fermin  MRN: [id number]  Admit Date: 428  : 1955  AGE: 70 y.o.   GENDER: female  ==============================================================================  TODAY'S ASSESSMENT AND PLAN OF CARE:  Patient is a 70-year-old female with a 12-hour history of lower abdominal pain maximally tender in the right lower quadrant with leukocytosis of 13 and CT scan concerning for acute appendicitis.  Given clinical findings risks and benefit of surgery discussed with patient and she wishes to proceed with surgical intervention at this time.    Plan:  - OR for laparoscopic appendectomy  - LR at 100/hr  - Zosyn 3.375 g every 6 hours  -N.p.o.  - Rest of care per primary    Patient will be discussed with Attending Physician Dr. Cami Dietrich PGY4  General Surgery Service   ==============================================================================  CHIEF COMPLAINT/REASON FOR CONSULT:  Patient is a 70-year-old female with no surgical history and past medical history including nonspecific colitis of the descending and transverse colon who presented with 12 hours abdominal pain.  Patient states that she had a lower endoscopy at [facility] in 2024 for change in bowel habits at this time biopsies were taken of the descending colon only notable findings were petechial hemorrhages without the rectosigmoid and descending portions of the colon and a floppy cecum.  Patient presents today for abdominal pain of about 12 hours.  States she has never had pain like this before denies any diarrhea, hematochezia, melena or any other associated symptoms.  CT scan showed appendix with mural thickening to 1.2 cm without adjacent inflammatory changes.  General surgery was consulted for concern for acute appendicitis    PAST MEDICAL HISTORY:   [facility]: Hypertension, restless leg syndrome, anxiety depression  Medical  History[1]      PSH: Denies  Surgical History[2]    Family History[3]  SOCIAL HISTORY:    Smoking: Denies Tobacco Use History[4]    Alcohol: Rare  Social History     Substance and Sexual Activity   Alcohol Use Never        Prior to Admission medications    Medication Sig Start Date End Date Taking? Authorizing Provider   alendronate (Fosamax) 70 mg tablet Take 1 tablet (70 mg) by mouth every 7 days. 4/21/25   Juliet Cortez MD PhD   amLODIPine (Norvasc) 10 mg tablet Take 1 tablet (10 mg) by mouth once daily. 4/21/25   Juliet Cortez MD PhD   buPROPion SR (Wellbutrin SR) 150 mg 12 hr tablet Take 1 tablet (150 mg) by mouth every 12 hours. 4/21/25   Juliet Cortez MD PhD   DULoxetine (Cymbalta) 60 mg DR capsule Take 1 capsule (60 mg) by mouth once daily. 4/21/25   Juliet Cortez MD PhD   gabapentin (Neurontin) 300 mg capsule Take 1 capsule (300 mg) by mouth once daily at bedtime. 4/21/25   Juliet Cortez MD PhD       RX Allergies[5]    REVIEW OF SYSTEMS:  Review of Systems   Constitutional:  Positive for activity change and appetite change.   HENT:  Negative for congestion.    Respiratory:  Negative for chest tightness.    Gastrointestinal:  Positive for abdominal pain. Negative for abdominal distention, diarrhea, nausea and vomiting.   Genitourinary:  Negative for dysuria.   Skin:  Negative for color change.   Neurological:  Negative for dizziness.   Psychiatric/Behavioral:  Negative for agitation.      PHYSICAL EXAM:  Physical Exam  Constitutional:       Appearance: Normal appearance.   HENT:      Head: Normocephalic.      Nose: Nose normal.      Mouth/Throat:      Mouth: Mucous membranes are moist.   Eyes:      Pupils: Pupils are equal, round, and reactive to light.   Cardiovascular:      Rate and Rhythm: Normal rate.   Abdominal:      General: Abdomen is flat.      Comments: Right lower and left lower quadrant maximally on the right lower quadrant.  No rebound or guarding no distention no surgical scars present    Musculoskeletal:      Cervical back: Normal range of motion.   Skin:     General: Skin is warm.      Capillary Refill: Capillary refill takes less than 2 seconds.   Neurological:      General: No focal deficit present.      Mental Status: She is alert.   Psychiatric:         Mood and Affect: Mood normal.           LABS:  Results from last 7 days   Lab Units 04/28/25  0855   WBC AUTO x10*3/uL 13.4*   HEMOGLOBIN g/dL 13.9   HEMATOCRIT % 42.5   PLATELETS AUTO x10*3/uL 255   NEUTROS PCT AUTO % 81.1   LYMPHS PCT AUTO % 12.4   MONOS PCT AUTO % 5.7   EOS PCT AUTO % 0.1         Results from last 7 days   Lab Units 04/28/25  0855   SODIUM mmol/L 138   POTASSIUM mmol/L 4.2   CHLORIDE mmol/L 102   CO2 mmol/L 26   BUN mg/dL 15   CREATININE mg/dL 0.87   CALCIUM mg/dL 9.8   PROTEIN TOTAL g/dL 7.3   BILIRUBIN TOTAL mg/dL 0.5   ALK PHOS U/L 100   ALT U/L 14   AST U/L 18   GLUCOSE mg/dL 123*     Results from last 7 days   Lab Units 04/28/25  0855   BILIRUBIN TOTAL mg/dL 0.5             I have reviewed all laboratory and imaging results ordered/pertinent for this encounter.        [1]   Past Medical History:  Diagnosis Date    Other specified health status     No pertinent past medical history   [2]   Past Surgical History:  Procedure Laterality Date    OTHER SURGICAL HISTORY  06/13/2022    No history of surgery   [3]   Family History  Problem Relation Name Age of Onset    Colon cancer Mother      Lung cancer Mother      Colon cancer Other grandparent     Uterine cancer Other grandparent     Stroke Other grandparent    [4]   Social History  Tobacco Use   Smoking Status Never   Smokeless Tobacco Never   [5] No Known Allergies

## 2025-04-28 NOTE — ANESTHESIA POSTPROCEDURE EVALUATION
"Patient: Laurie Fermin \"Shaina\"    Procedure Summary       Date: 04/28/25 Room / Location: [facility] OR 01 / Virtual [facility] OR    Anesthesia Start: 1624 Anesthesia Stop: 1746    Procedure: APPENDECTOMY, LAPAROSCOPIC, possible open Diagnosis:       Acute appendicitis, unspecified acute appendicitis type      (Acute appendicitis, unspecified acute appendicitis type [K35.80])    Surgeons: Sridhar Almanza MD Responsible Provider: MARLEEN Crocker    Anesthesia Type: general ASA Status: 2            Anesthesia Type: general    Vitals Value Taken Time   BP 90/55 04/28/25 18:37   Temp 37.4 °C (99.4 °F) 04/28/25 18:15   Pulse 93 04/28/25 18:38   Resp 12 04/28/25 18:38   SpO2 90 % 04/28/25 18:38   Vitals shown include unfiled device data.    Anesthesia Post Evaluation    Patient location during evaluation: bedside  Patient participation: complete - patient participated  Level of consciousness: awake  Pain score: 0  Pain management: adequate  Airway patency: patent  Cardiovascular status: acceptable  Respiratory status: acceptable  Hydration status: acceptable  Postoperative Nausea and Vomiting: none        There were no known notable events for this encounter.    "

## 2025-04-28 NOTE — ED PROVIDER NOTES
Chief Complaint   Patient presents with    Flank Pain     Pt has lower left sided abd pain since last night, pt has been able to urinate and have bowel movements.     Abdominal Pain     Since last night    Nausea       HPI       70 year old female presents to the Emergency Department today complaining of lower abdominal pain that she describes as sharp in nature, constant since last evening, non-radiating, and varies in intensity. Notes to having nausea associated with the above. Denies any associated fever, chills, headache, neck pain, chest pain, shortness of breath, vomiting, diarrhea, constipation, hematemesis, hematochezia, melena, or urinary symptoms.       History provided by:  Patient             Patient History   Medical History[1]  Surgical History[2]  Family History[3]  Social History[4]        Physical Exam  Constitutional:       Appearance: Normal appearance.   HENT:      Head: Normocephalic.      Right Ear: External ear normal.      Left Ear: External ear normal.      Nose: Nose normal.      Mouth/Throat:      Mouth: Mucous membranes are moist.      Pharynx: Oropharynx is clear. No oropharyngeal exudate or posterior oropharyngeal erythema.   Eyes:      Conjunctiva/sclera: Conjunctivae normal.      Pupils: Pupils are equal, round, and reactive to light.   Cardiovascular:      Rate and Rhythm: Normal rate and regular rhythm.      Pulses:           Radial pulses are 3+ on the right side and 3+ on the left side.        Dorsalis pedis pulses are 3+ on the right side and 3+ on the left side.      Heart sounds: Normal heart sounds. No murmur heard.     No friction rub. No gallop.   Pulmonary:      Effort: Pulmonary effort is normal. No respiratory distress.      Breath sounds: Normal breath sounds. No wheezing, rhonchi or rales.   Abdominal:      General: Abdomen is flat. Bowel sounds are normal.      Palpations: Abdomen is soft.      Tenderness: There is abdominal tenderness in the right lower quadrant,  suprapubic area and left lower quadrant. There is no right CVA tenderness, left CVA tenderness, guarding or rebound. Negative signs include Blankenship's sign and McBurney's sign.   Musculoskeletal:         General: No swelling or deformity.      Cervical back: Full passive range of motion without pain.      Right lower leg: No edema.      Left lower leg: No edema.   Lymphadenopathy:      Cervical: No cervical adenopathy.   Skin:     Capillary Refill: Capillary refill takes less than 2 seconds.      Coloration: Skin is not jaundiced.      Findings: No rash.   Neurological:      General: No focal deficit present.      Mental Status: She is alert and oriented to person, place, and time. Mental status is at baseline.      Gait: Gait is intact.   Psychiatric:         Mood and Affect: Mood normal.         Behavior: Behavior is cooperative.         Labs Reviewed   CBC WITH AUTO DIFFERENTIAL - Abnormal       Result Value    WBC 13.4 (*)     nRBC 0.0      RBC 4.79      Hemoglobin 13.9      Hematocrit 42.5      MCV 89      MCH 29.0      MCHC 32.7      RDW 13.0      Platelets 255      Neutrophils % 81.1      Immature Granulocytes %, Automated 0.3      Lymphocytes % 12.4      Monocytes % 5.7      Eosinophils % 0.1      Basophils % 0.4      Neutrophils Absolute 10.84 (*)     Immature Granulocytes Absolute, Automated 0.04      Lymphocytes Absolute 1.66      Monocytes Absolute 0.76      Eosinophils Absolute 0.02      Basophils Absolute 0.06     COMPREHENSIVE METABOLIC PANEL - Abnormal    Glucose 123 (*)     Sodium 138      Potassium 4.2      Chloride 102      Bicarbonate 26      Anion Gap 14      Urea Nitrogen 15      Creatinine 0.87      eGFR 72      Calcium 9.8      Albumin 4.3      Alkaline Phosphatase 100      Total Protein 7.3      AST 18      Bilirubin, Total 0.5      ALT 14     URINALYSIS WITH REFLEX CULTURE AND MICROSCOPIC - Abnormal    Color, Urine Light-Yellow      Appearance, Urine Clear      Specific Gravity, Urine 1.025       pH, Urine 8.0      Protein, Urine NEGATIVE      Glucose, Urine Normal      Blood, Urine NEGATIVE      Ketones, Urine 10 (1+) (*)     Bilirubin, Urine NEGATIVE      Urobilinogen, Urine Normal      Nitrite, Urine NEGATIVE      Leukocyte Esterase, Urine NEGATIVE     LACTATE - Normal    Lactate 1.7      Narrative:     Venipuncture immediately after or during the administration of Metamizole may lead to falsely low results. Testing should be performed immediately prior to Metamizole dosing.   URINALYSIS WITH REFLEX CULTURE AND MICROSCOPIC    Narrative:     The following orders were created for panel order Urinalysis with Reflex Culture and Microscopic.  Procedure                               Abnormality         Status                     ---------                               -----------         ------                     Urinalysis with Reflex C...[[id number]]  Abnormal            Final result               Extra Urine Gray Tube[[id number]]                            In process                   Please view results for these tests on the individual orders.   EXTRA URINE GRAY TUBE       CT abdomen pelvis w IV contrast   Final Result   1. Mural thickening of the appendix measuring up to 1.2 cm in   diameter without adjacent inflammatory change. The need for close   follow-up which may include repeat CT of the abdomen and pelvis can   be determined on a clinical basis. This finding was communicated with   the ordering provider by myself at the time of dictation.   2. No bowel obstruction.   3. Age-indeterminate T11 vertebral body compression fracture.   Correlation with point tenderness and consideration of further   evaluation with MRI of the thoracic spine is recommended.   4. Additional chronic and/or ancillary findings detailed above.        MACRO:   None        Signed by: Baljinder Lawrence 4/28/2025 10:52 AM   Dictation workstation:   [id number]               ED Course & [facility]   ED Course as of 04/28/25 1237   Mon Apr  28, 2025   0941 Pain was initially improved with Toradol , but has now worsened. Morphine ordered.  [JZ]   1115 Radiology previously notified me of the early appendicitis noted on the CT. Reached out to Dr. Almanza, surgeon on call, who agrees to see the patient on consult. Patient aware and in agreement with the plan.  [JZ]      ED Course User Index  [JZ] Kwame ELAINE Bayron, APRN-CNP         Diagnoses as of 04/28/25 1237   Acute appendicitis, unspecified acute appendicitis type           Medical Decision Making  Patient was seen and evaluated by myself. Saline lock was established with labs drawn and results as above. Given 1 Liter of normal saline wide open over 1 hour. Given Zofran and Toradol as well. After receiving the medications and IV fluids, reported feeling improved. Later, she was given Morphine with improvement in her pain. Noted to have an elevated WBC count of 13.4. Remainder of blood counts, electrolytes, kidney function, liver function, and lactate were unremarkable. Urinalysis shows no signs of infection. CT scan of her abdomen and pelvis with IV contrast shows mural thickening of the appendix measuring up to 1.2 cm in diameter without adjacent inflammatory change, no bowel obstruction, and age-indeterminate T11 vertebral body compression fracture. Discussed the case with Dr. Almanza, surgeon on call, who agrees to see the patient on consult. Case was discussed with JASON Carmona CNP, [facility], who agrees to place the patient under 23 hour observation for further evaluation and care. Will be transferred to the medical floor in stable condition.     Diagnostic Impression:    1. Acute early appendicitis    2. IV meds and fluids in ED              Your medication list        ASK your doctor about these medications        Instructions Last Dose Given Next Dose Due   alendronate 70 mg tablet  Commonly known as: Fosamax      Take 1 tablet (70 mg) by mouth every 7 days.       amLODIPine 10 mg tablet  Commonly known as:  Norvasc      Take 1 tablet (10 mg) by mouth once daily.       buPROPion  mg 12 hr tablet  Commonly known as: Wellbutrin SR      Take 1 tablet (150 mg) by mouth every 12 hours.       DULoxetine 60 mg DR capsule  Commonly known as: Cymbalta      Take 1 capsule (60 mg) by mouth once daily.       gabapentin 300 mg capsule  Commonly known as: Neurontin      Take 1 capsule (300 mg) by mouth once daily at bedtime.                  Procedure  Procedures       [1]   Past Medical History:  Diagnosis Date    Other specified health status     No pertinent past medical history   [2]   Past Surgical History:  Procedure Laterality Date    OTHER SURGICAL HISTORY  06/13/2022    No history of surgery   [3]   Family History  Problem Relation Name Age of Onset    Colon cancer Mother      Lung cancer Mother      Colon cancer Other grandparent     Uterine cancer Other grandparent     Stroke Other grandparent    [4]   Social History  Tobacco Use    Smoking status: Never    Smokeless tobacco: Never   Substance Use Topics    Alcohol use: Never    Drug use: Never        Kwame Verma, MARIA VICTORIA-CNP  04/28/25 1640

## 2025-04-28 NOTE — ANESTHESIA PROCEDURE NOTES
Airway  Date/Time: 4/28/2025 4:38 PM  Reason: elective    Airway not difficult    Staffing  Performed: CRNA   Authorized by: MARLEEN Crocker    Performed by: MARLEEN Crocker  Patient location during procedure: OR    Patient Condition  Indications for airway management: anesthesia  Patient position: sniffing  Planned trial extubation  Sedation level: deep     Final Airway Details   Preoxygenated: yes  Final airway type: endotracheal airway  Successful airway: ETT  Cuffed: yes   Successful intubation technique: video laryngoscopy  Adjuncts used in placement: intubating stylet  Endotracheal tube insertion site: oral  Blade: Agustin  Blade size: #4  ETT size (mm): 7.0  Cormack-Lehane Classification: grade IIa - partial view of glottis  Placement verified by: chest auscultation and capnometry   Cuff volume (mL): 7  Measured from: lips  ETT to lips (cm): 21  Ventilation between attempts: none  Number of attempts at approach: 1  Number of other approaches attempted: 0    Additional Comments  Atraumatic intubation osborn 4. Lips teeth same as preanesthetic condition

## 2025-04-28 NOTE — PROGRESS NOTES
"Physical Therapy                 Therapy Communication Note    Patient Name: Laurie Fermin \"Shaina\"  MRN: [id number]  Department: [facility] 3 E  Room: [address] [place], [place] [address]  Today's Date: 4/28/2025     Discipline: Physical Therapy          Missed Visit Reason:  Patient awaiting appendectomy, to be taken to surgery in the next 20 minutes per nurse. PT to reattempt evaluation as needed post procedure.    Missed Time: Attempt       "

## 2025-04-28 NOTE — ANESTHESIA PREPROCEDURE EVALUATION
"Patient: Laurie Fermin \"Shaina\"    Procedure Information       Date/Time: 04/28/25 1600    Procedure: APPENDECTOMY, LAPAROSCOPIC, possible open    Location: [facility] OR 01 / Virtual [facility] OR    Surgeons: Sridhar Almanza MD            Relevant Problems   Anesthesia (within normal limits)      Cardiac   (+) Primary hypertension      Pulmonary (within normal limits)      Neuro   (+) Depression, major, single episode, moderate (Multi)       Clinical information reviewed:    Allergies  Meds  Problems              NPO Detail:  NPO/Void Status  Date of Last Liquid: 04/28/25  Time of Last Liquid: 0800  Date of Last Solid: 04/27/25  Time of Last Solid: 1800  Last Intake Type: Food         Physical Exam    Airway  Mallampati: III  TM distance: >3 FB  Neck ROM: full  Mouth opening: 3 or more finger widths     Cardiovascular   Rhythm: regular  Rate: normal     Dental     (+) upper dentures     Pulmonary - normal exam   Abdominal      Other findings: Upper partials        Anesthesia Plan    History of general anesthesia?: yes  History of complications of general anesthesia?: no    ASA 2     general     The patient is not a current smoker.    intravenous induction   Postoperative pain plan includes opioids.  Trial extubation is planned.  Anesthetic plan and risks discussed with patient.  Use of blood products discussed with patient who consented to blood products.    Plan discussed with CRNA.      "

## 2025-04-29 VITALS
HEART RATE: 90 BPM | BODY MASS INDEX: 25.9 KG/M2 | WEIGHT: 165 LBS | OXYGEN SATURATION: 94 % | HEIGHT: 67 IN | TEMPERATURE: 98.8 F | RESPIRATION RATE: 17 BRPM | SYSTOLIC BLOOD PRESSURE: 97 MMHG | DIASTOLIC BLOOD PRESSURE: 56 MMHG

## 2025-04-29 LAB
ANION GAP SERPL CALC-SCNC: 10 MMOL/L (ref 10–20)
BUN SERPL-MCNC: 22 MG/DL (ref 6–23)
CALCIUM SERPL-MCNC: 8.1 MG/DL (ref 8.6–10.3)
CHLORIDE SERPL-SCNC: 105 MMOL/L (ref 98–107)
CO2 SERPL-SCNC: 25 MMOL/L (ref 21–32)
CREAT SERPL-MCNC: 0.99 MG/DL (ref 0.5–1.05)
EGFRCR SERPLBLD CKD-EPI 2021: 61 ML/MIN/1.73M*2
ERYTHROCYTE [DISTWIDTH] IN BLOOD BY AUTOMATED COUNT: 13.4 % (ref 11.5–14.5)
GLUCOSE SERPL-MCNC: 114 MG/DL (ref 74–99)
HCT VFR BLD AUTO: 32.8 % (ref 36–46)
HGB BLD-MCNC: 10.8 G/DL (ref 12–16)
MCH RBC QN AUTO: 30.2 PG (ref 26–34)
MCHC RBC AUTO-ENTMCNC: 32.9 G/DL (ref 32–36)
MCV RBC AUTO: 92 FL (ref 80–100)
NRBC BLD-RTO: 0 /100 WBCS (ref 0–0)
PLATELET # BLD AUTO: 188 X10*3/UL (ref 150–450)
POTASSIUM SERPL-SCNC: 4 MMOL/L (ref 3.5–5.3)
RBC # BLD AUTO: 3.58 X10*6/UL (ref 4–5.2)
SODIUM SERPL-SCNC: 136 MMOL/L (ref 136–145)
WBC # BLD AUTO: 26.5 X10*3/UL (ref 4.4–11.3)

## 2025-04-29 PROCEDURE — G0378 HOSPITAL OBSERVATION PER HR: HCPCS

## 2025-04-29 PROCEDURE — 80048 BASIC METABOLIC PNL TOTAL CA: CPT | Performed by: STUDENT IN AN ORGANIZED HEALTH CARE EDUCATION/TRAINING PROGRAM

## 2025-04-29 PROCEDURE — 36415 COLL VENOUS BLD VENIPUNCTURE: CPT | Performed by: STUDENT IN AN ORGANIZED HEALTH CARE EDUCATION/TRAINING PROGRAM

## 2025-04-29 PROCEDURE — 85027 COMPLETE CBC AUTOMATED: CPT | Performed by: STUDENT IN AN ORGANIZED HEALTH CARE EDUCATION/TRAINING PROGRAM

## 2025-04-29 PROCEDURE — 99239 HOSP IP/OBS DSCHRG MGMT >30: CPT | Performed by: INTERNAL MEDICINE

## 2025-04-29 PROCEDURE — 2500000001 HC RX 250 WO HCPCS SELF ADMINISTERED DRUGS (ALT 637 FOR MEDICARE OP): Performed by: STUDENT IN AN ORGANIZED HEALTH CARE EDUCATION/TRAINING PROGRAM

## 2025-04-29 RX ORDER — OXYCODONE AND ACETAMINOPHEN 5; 325 MG/1; MG/1
1 TABLET ORAL EVERY 6 HOURS PRN
Qty: 5 TABLET | Refills: 0 | Status: SHIPPED | OUTPATIENT
Start: 2025-04-29 | End: 2025-05-19

## 2025-04-29 RX ADMIN — ACETAMINOPHEN 650 MG: 325 TABLET ORAL at 13:33

## 2025-04-29 RX ADMIN — ACETAMINOPHEN 650 MG: 325 TABLET ORAL at 07:05

## 2025-04-29 RX ADMIN — DULOXETINE HYDROCHLORIDE 60 MG: 30 CAPSULE, DELAYED RELEASE ORAL at 08:29

## 2025-04-29 RX ADMIN — BUPROPION HYDROCHLORIDE 150 MG: 150 TABLET, FILM COATED, EXTENDED RELEASE ORAL at 08:29

## 2025-04-29 RX ADMIN — ACETAMINOPHEN 650 MG: 325 TABLET ORAL at 01:41

## 2025-04-29 RX ADMIN — PANTOPRAZOLE SODIUM 40 MG: 40 TABLET, DELAYED RELEASE ORAL at 07:05

## 2025-04-29 SDOH — ECONOMIC STABILITY: FOOD INSECURITY: WITHIN THE PAST 12 MONTHS, YOU WORRIED THAT YOUR FOOD WOULD RUN OUT BEFORE YOU GOT MONEY TO BUY MORE.: NEVER TRUE

## 2025-04-29 SDOH — ECONOMIC STABILITY: GENERAL

## 2025-04-29 SDOH — ECONOMIC STABILITY: HOUSING INSECURITY: IN THE PAST 12 MONTHS HAS THE ELECTRIC, GAS, OIL, OR WATER COMPANY THREATENED TO SHUT OFF SERVICES IN YOUR HOME?: NO

## 2025-04-29 SDOH — ECONOMIC STABILITY: HOUSING INSECURITY: IN THE LAST 12 MONTHS, WAS THERE A TIME WHEN YOU WERE NOT ABLE TO PAY THE MORTGAGE OR RENT ON TIME?: NO

## 2025-04-29 SDOH — ECONOMIC STABILITY: FOOD INSECURITY: WITHIN THE PAST 12 MONTHS, THE FOOD YOU BOUGHT JUST DIDN'T LAST AND YOU DIDN'T HAVE MONEY TO GET MORE.: NEVER TRUE

## 2025-04-29 SDOH — ECONOMIC STABILITY: HOUSING INSECURITY: IN THE LAST 12 MONTHS, HOW MANY PLACES HAVE YOU LIVED?: 1

## 2025-04-29 SDOH — ECONOMIC STABILITY: HOUSING INSECURITY

## 2025-04-29 SDOH — ECONOMIC STABILITY: INCOME INSECURITY: IN THE LAST 12 MONTHS, WAS THERE A TIME WHEN YOU WERE NOT ABLE TO PAY THE MORTGAGE OR RENT ON TIME?: NO

## 2025-04-29 SDOH — ECONOMIC STABILITY: FOOD INSECURITY

## 2025-04-29 SDOH — ECONOMIC STABILITY: HOUSING INSECURITY
IN THE LAST 12 MONTHS, WAS THERE A TIME WHEN YOU DID NOT HAVE A STEADY PLACE TO SLEEP OR SLEPT IN A SHELTER (INCLUDING NOW)?: NO

## 2025-04-29 SDOH — ECONOMIC STABILITY: TRANSPORTATION INSECURITY
IN THE PAST 12 MONTHS, HAS THE LACK OF TRANSPORTATION KEPT YOU FROM MEDICAL APPOINTMENTS OR FROM GETTING MEDICATIONS?: NO

## 2025-04-29 SDOH — ECONOMIC STABILITY: TRANSPORTATION INSECURITY
IN THE PAST 12 MONTHS, HAS LACK OF TRANSPORTATION KEPT YOU FROM MEETINGS, WORK, OR FROM GETTING THINGS NEEDED FOR DAILY LIVING?: NO

## 2025-04-29 SDOH — ECONOMIC STABILITY: TRANSPORTATION INSECURITY

## 2025-04-29 SDOH — ECONOMIC STABILITY: TRANSPORTATION INSECURITY: IN THE PAST 12 MONTHS, HAS LACK OF TRANSPORTATION KEPT YOU FROM MEDICAL APPOINTMENTS OR FROM GETTING MEDICATIONS?: NO

## 2025-04-29 SDOH — ECONOMIC STABILITY: FOOD INSECURITY: WITHIN THE PAST 12 MONTHS, YOU WORRIED THAT YOUR FOOD WOULD RUN OUT BEFORE YOU GOT THE MONEY TO BUY MORE.: NEVER TRUE

## 2025-04-29 ASSESSMENT — ENCOUNTER SYMPTOMS
WOUND: 0
WEAKNESS: 0
HALLUCINATIONS: 0
DIAPHORESIS: 0
HEMATURIA: 0
CONSTIPATION: 0
PALPITATIONS: 0
CONFUSION: 0
NERVOUS/ANXIOUS: 0
WHEEZING: 0
NAUSEA: 1
AGITATION: 0
CHILLS: 0
SORE THROAT: 0
FEVER: 0
SHORTNESS OF BREATH: 0
JOINT SWELLING: 0
FACIAL SWELLING: 0
COUGH: 0
DIARRHEA: 0
ABDOMINAL PAIN: 1
LIGHT-HEADEDNESS: 0
CHOKING: 0
DYSURIA: 0
FATIGUE: 0
CHEST TIGHTNESS: 0
VOMITING: 0
BACK PAIN: 0
NECK PAIN: 0

## 2025-04-29 ASSESSMENT — COGNITIVE AND FUNCTIONAL STATUS - GENERAL
DAILY ACTIVITIY SCORE: 24
MOBILITY SCORE: 23
CLIMB 3 TO 5 STEPS WITH RAILING: A LITTLE

## 2025-04-29 ASSESSMENT — ACTIVITIES OF DAILY LIVING (ADL): LACK_OF_TRANSPORTATION: NO

## 2025-04-29 ASSESSMENT — PAIN SCALES - GENERAL: PAINLEVEL_OUTOF10: 0 - NO PAIN

## 2025-04-29 ASSESSMENT — PAIN - FUNCTIONAL ASSESSMENT: PAIN_FUNCTIONAL_ASSESSMENT: 0-10

## 2025-04-29 ASSESSMENT — SOCIAL DETERMINANTS OF HEALTH (SDOH): IN THE PAST 12 MONTHS, HAS THE ELECTRIC, GAS, OIL, OR WATER COMPANY THREATENED TO SHUT OFF SERVICE IN YOUR HOME?: NO

## 2025-04-29 NOTE — ASSESSMENT & PLAN NOTE
Patient n.p.o. will be given IV hydration  As needed morphine for pain and Zofran for nausea  Consult to acute care surgery  4/29: Appendectomy done 4/28 per GS.  Discharge later today per general surgery.  Will continue to monitor and discharge if appropriate.  Okay to discharge and follow-up with general surgery as outpatient.  Follow-up PCP as outpatient.  Pain is controlled.  Okay to discharge home with oral pain medication.  No antibiotics required.  Follow-up with general surgery in 2 weeks.

## 2025-04-29 NOTE — ASSESSMENT & PLAN NOTE
Patient's blood pressure typically well-controlled  Patient with recent stress echocardiogram showing normal EF no evidence of inducible ischemia  4/29: Stable.

## 2025-04-29 NOTE — NURSING NOTE
Patient discharging home per order. Discharge instructions reviewed with patient. Medication changes, additions, and med list reviewed with patient in addition to what she still needs to take for the rest of the day. Medications instructions reviewed with the patient. Patient to  medications at [facility] pharmacy, patient notified and verbalized understanding. IV removed x1 with tip of catheter intact per policy. No tele to be removed or returned. All questions and concerns addressed at this time. All belongings packed up. Patient discharging off unit in wheelchair with volunteer and significant other.

## 2025-04-29 NOTE — PROGRESS NOTES
04/29/25 1100   Discharge Planning   Living Arrangements Spouse/significant other   Support Systems Spouse/significant other   Assistance Needed independent   Type of Residence Private residence   Home or Post Acute Services None   Expected Discharge Disposition Home   Does the patient need discharge transport arranged? No   Intensity of Service   Intensity of Service 0-30 min     Met with patient, she is from home with spouse. Independent in her own care at baseline PT OT as seen and signed off no needs. Patient is s/p lap appe with surgery She will return home when medically ready. No needs. TCC to follow if needs arise.

## 2025-04-29 NOTE — PROGRESS NOTES
"Physical Therapy Screen                 Therapy Communication Note    Patient Name: Laurie Fermin \"Shaina\"  MRN: [id number]  Department: [facility] 3 E  Room: [address] [place] [address]  Today's Date: 4/29/2025     Discipline: Physical Therapy          Interviewed patient for PT needs. Patient (with spouse confirmation) reports she has been up in her room without issue independently; no concerns with mobility upon homegoing. Patient presents with no therapy needs at this time; pt agreeable with DC PT.       "

## 2025-04-29 NOTE — DISCHARGE SUMMARY
"Discharge Diagnosis  Assessment & Plan  Appendicitis  Patient n.p.o. will be given IV hydration  As needed morphine for pain and Zofran for nausea  Consult to acute care surgery  4/29: Appendectomy done 4/28 per GS.  Discharge later today per general surgery.  Will continue to monitor and discharge if appropriate.  Okay to discharge and follow-up with general surgery as outpatient.  Follow-up PCP as outpatient.  Pain is controlled.  Okay to discharge home with oral pain medication.  No antibiotics required.  Follow-up with general surgery in 2 weeks.  Restless legs syndrome    Primary hypertension  Patient's blood pressure typically well-controlled  Patient with recent stress echocardiogram showing normal EF no evidence of inducible ischemia  4/29: Stable.         Issues Requiring Follow-Up  Follow-up with general surgery in 2 weeks.    Test Results Pending At Discharge  Pending Labs       Order Current Status    Surgical Pathology Exam In process            Hospital Course   Laurie Fermin \"Shaina\" is a 70 y.o. female with PMHx s/f hypertension presenting with abdominal pain nausea.  Patient presented after developing abdominal pain across the lower abdomen.  The pain was sharp and it been fairly constant since last evening with some associated nausea.  Patient was given IV pain medicine with improvement of her pain and nausea.  In the ED she had CT scan suggestive of early appendicitis with mild leukocytosis.  The patient denies anorexia fevers chills rigors diarrhea constipation.  Patient has not had similar symptoms in the past.  She has not been having any chest pain shortness of breath dizziness lightheadedness orthopnea lower extremity edema.  She recently had a stress echocardiogram that was normal.  Case was discussed with the ED provider patient will be admitted for her abdominal pain and acute pancreatitis.      4/29: Doing well.  Tolerating diet.  Hoping to be discharged to home later today.  No fevers " or chills.  No abdominal distention.  Discussed with general surgery team, will need to reassess her but anticipate discharge to home later today.  Will need to follow-up with general surgery as outpatient.    Addendum: Discussed with general surgery.  Okay to discharge to home.  Will provide some oral pain medication.  No need for antibiotics.  General surgery.  Will have patient schedule follow-up appointment for 2 weeks.  Resume diet on discharge.  Follow-up PCP in 1 to 2 weeks.    This discharge took greater than 35 minutes to arrange.    Pertinent Physical Exam At Time of Discharge  Physical Exam  Constitutional:       General: She is not in acute distress.  HENT:      Head: Normocephalic and atraumatic.      Nose: Nose normal. No congestion or rhinorrhea.      Mouth/Throat:      Mouth: Mucous membranes are dry.      Pharynx: Oropharynx is clear.   Eyes:      General: No scleral icterus.     Extraocular Movements: Extraocular movements intact.      Pupils: Pupils are equal, round, and reactive to light.   Cardiovascular:      Rate and Rhythm: Normal rate and regular rhythm.      Heart sounds: Normal heart sounds. No murmur heard.     No friction rub. No gallop.   Pulmonary:      Effort: Pulmonary effort is normal.      Breath sounds: Normal breath sounds. No wheezing, rhonchi or rales.   Chest:      Chest wall: No tenderness.   Abdominal:      General: There is no distension.      Palpations: Abdomen is soft.      Tenderness: There is no abdominal tenderness. There is no guarding or rebound.   Musculoskeletal:         General: No swelling, tenderness or signs of injury. Normal range of motion.      Cervical back: Normal range of motion.   Skin:     General: Skin is warm and dry.      Coloration: Skin is not jaundiced.      Findings: No bruising, erythema or rash.   Neurological:      General: No focal deficit present.      Mental Status: She is alert and oriented to person, place, and time.         Home  Medications     Medication List      START taking these medications     oxyCODONE-acetaminophen 5-325 mg tablet; Commonly known as: Percocet;   Take 1 tablet by mouth every 6 hours if needed for moderate pain (4 - 6)   or severe pain (7 - 10) for up to 20 days.     CONTINUE taking these medications     alendronate 70 mg tablet; Commonly known as: Fosamax; Take 1 tablet (70   mg) by mouth every 7 days.   amLODIPine 10 mg tablet; Commonly known as: Norvasc; Take 1 tablet (10   mg) by mouth once daily.   buPROPion  mg 12 hr tablet; Commonly known as: Wellbutrin SR; Take   1 tablet (150 mg) by mouth every 12 hours.   CALCIUM 500 ORAL   DULoxetine 60 mg DR capsule; Commonly known as: Cymbalta; Take 1 capsule   (60 mg) by mouth once daily.   gabapentin 300 mg capsule; Commonly known as: Neurontin; Take 1 capsule   (300 mg) by mouth once daily at bedtime.   multivitamin tablet   Vitamin B-12 50 mcg tablet; Generic drug: cyanocobalamin       Outpatient Follow-Up  Future Appointments   Date Time Provider Department Center   5/21/2025  1:40 PM MARIA VICTORIA Pedro-CNP [id number]       Sridhar Garrett MD

## 2025-04-29 NOTE — ASSESSMENT & PLAN NOTE
Patient n.p.o. will be given IV hydration  As needed morphine for pain and Zofran for nausea  Consult to acute care surgery  4/29: Appendectomy done 4/28 per GS.  Likely discharge later today per general surgery.  Will continue to monitor and discharge if appropriate.

## 2025-04-29 NOTE — CARE PLAN
Problem: Pain - Adult  Goal: Verbalizes/displays adequate comfort level or baseline comfort level  Outcome: Progressing     Problem: Safety - Adult  Goal: Free from fall injury  Outcome: Progressing     Problem: Discharge Planning  Goal: Discharge to home or other facility with appropriate resources  Outcome: Progressing     Problem: Chronic Conditions and Co-morbidities  Goal: Patient's chronic conditions and co-morbidity symptoms are monitored and maintained or improved  Outcome: Progressing     Problem: Nutrition  Goal: Nutrient intake appropriate for maintaining nutritional needs  Outcome: Progressing     Problem: Pain  Goal: Takes deep breaths with improved pain control throughout the shift  Outcome: Progressing  Goal: Turns in bed with improved pain control throughout the shift  Outcome: Progressing  Goal: Walks with improved pain control throughout the shift  Outcome: Progressing  Goal: Performs ADL's with improved pain control throughout shift  Outcome: Progressing  Goal: Participates in PT with improved pain control throughout the shift  Outcome: Progressing  Goal: Free from opioid side effects throughout the shift  Outcome: Progressing  Goal: Free from acute confusion related to pain meds throughout the shift  Outcome: Progressing     Problem: Skin  Goal: Promote skin healing  Outcome: Progressing  Flowsheets (Taken 4/29/2025 1669)  Promote skin healing:   Assess skin/pad under line(s)/device(s)   Protective dressings over bony prominences   Ensure correct size (line/device) and apply per  instructions     Problem: Fall/Injury  Goal: Not fall by end of shift  Outcome: Progressing  Goal: Be free from injury by end of the shift  Outcome: Progressing  Goal: Verbalize understanding of personal risk factors for fall in the hospital  Outcome: Progressing  Goal: Verbalize understanding of risk factor reduction measures to prevent injury from fall in the home  Outcome: Progressing  Goal: Use  assistive devices by end of the shift  Outcome: Progressing  Goal: Pace activities to prevent fatigue by end of the shift  Outcome: Progressing

## 2025-04-29 NOTE — CARE PLAN
The patient's goals for the shift include  go home    The clinical goals for the shift include pt remain free from falls and injuries, vitals remain stable, control pain, participate in pressure redistribution, toerate diet      Problem: Pain - Adult  Goal: Verbalizes/displays adequate comfort level or baseline comfort level  Outcome: Progressing     Problem: Safety - Adult  Goal: Free from fall injury  Outcome: Progressing     Problem: Discharge Planning  Goal: Discharge to home or other facility with appropriate resources  Outcome: Progressing     Problem: Chronic Conditions and Co-morbidities  Goal: Patient's chronic conditions and co-morbidity symptoms are monitored and maintained or improved  Outcome: Progressing     Problem: Nutrition  Goal: Nutrient intake appropriate for maintaining nutritional needs  Outcome: Progressing     Problem: Pain  Goal: Takes deep breaths with improved pain control throughout the shift  Outcome: Progressing  Goal: Turns in bed with improved pain control throughout the shift  Outcome: Progressing  Goal: Walks with improved pain control throughout the shift  Outcome: Progressing  Goal: Performs ADL's with improved pain control throughout shift  Outcome: Progressing  Goal: Participates in PT with improved pain control throughout the shift  Outcome: Progressing  Goal: Free from opioid side effects throughout the shift  Outcome: Progressing  Goal: Free from acute confusion related to pain meds throughout the shift  Outcome: Progressing     Problem: Skin  Goal: Promote skin healing  Outcome: Progressing  Flowsheets (Taken 4/29/2025 0807)  Promote skin healing:   Assess skin/pad under line(s)/device(s)   Turn/reposition every 2 hours/use positioning/transfer devices   Rotate device position/do not position patient on device   Protective dressings over bony prominences   Ensure correct size (line/device) and apply per  instructions  Goal: Decreased wound size/increased tissue  granulation at next dressing change  Outcome: Progressing  Flowsheets (Taken 4/29/2025 0807)  Decreased wound size/increased tissue granulation at next dressing change:   Promote sleep for wound healing   Protective dressings over bony prominences  Goal: Participates in plan/prevention/treatment measures  Outcome: Progressing  Flowsheets (Taken 4/29/2025 0807)  Participates in plan/prevention/treatment measures:   Discuss with provider PT/OT consult   Elevate heels  Goal: Prevent/manage excess moisture  Outcome: Progressing  Flowsheets (Taken 4/29/2025 0807)  Prevent/manage excess moisture:   Follow provider orders for dressing changes   Moisturize dry skin   Monitor for/manage infection if present  Goal: Prevent/minimize sheer/friction injuries  Outcome: Progressing  Flowsheets (Taken 4/29/2025 0807)  Prevent/minimize sheer/friction injuries:   HOB 30 degrees or less   Increase activity/out of bed for meals   Turn/reposition every 2 hours/use positioning/transfer devices   Use pull sheet  Goal: Promote/optimize nutrition  Outcome: Progressing  Flowsheets (Taken 4/29/2025 0807)  Promote/optimize nutrition:   Consume > 50% meals/supplements   Monitor/record intake including meals   Offer water/supplements/favorite foods     Problem: Fall/Injury  Goal: Not fall by end of shift  Outcome: Progressing  Goal: Be free from injury by end of the shift  Outcome: Progressing  Goal: Verbalize understanding of personal risk factors for fall in the hospital  Outcome: Progressing  Goal: Verbalize understanding of risk factor reduction measures to prevent injury from fall in the home  Outcome: Progressing  Goal: Use assistive devices by end of the shift  Outcome: Progressing  Goal: Pace activities to prevent fatigue by end of the shift  Outcome: Progressing     Problem: Respiratory  Goal: Clear secretions with interventions this shift  Outcome: Progressing  Goal: Minimize anxiety/maximize coping throughout shift  Outcome:  Progressing  Goal: Minimal/no exertional discomfort or dyspnea this shift  Outcome: Progressing  Goal: No signs of respiratory distress (eg. Use of accessory muscles. Peds grunting)  Outcome: Progressing  Goal: Patent airway maintained this shift  Outcome: Progressing

## 2025-04-29 NOTE — PROGRESS NOTES
Social work consult placed for discharge planning. SW reviewed pt's chart and communicated with TCC. No SW needs foreseen at this time. SW signing off; available upon request.    Deacon Carbone, MSW, LSW ([id number])   Care Transitions

## 2025-04-29 NOTE — PROGRESS NOTES
"Laurie Fermin \"Shaina\" is a 70 y.o. female on day 0 of admission presenting with Appendicitis.    Subjective   No acute events overnight, patient tolerating regular diet, patient is pain is controlled on oral medications       Objective         Physical Exam:  NEURO: A&O x3,   HEAD: NC/AT, No lacerations or abrasions  EENT: PERRL, EOMI. Pupils 4-2mm b/l.   NECK: No cervical spine tenderness or step offs, tracheal midline. No JVD.  RESPIRATORY/CHEST: Non-labored, equal chest expansion  CV: Regular rate   ABDOMEN: soft, appropriately tender surgical incisions with surgical dressing in place  BACK/SPINE: No thoracic midline tenderness, step-offs or deformities. No lumbar midline tenderness,   EXTREMITIES: No edema or cyanosis. Nml ROM w/o pain. No deformities, lacerations or contusions.     Last Recorded Vitals  Blood pressure 97/56, pulse 90, temperature 37.1 °C (98.8 °F), temperature source Temporal, resp. rate 17, height 1.702 m (5' 7\"), weight 74.8 kg (165 lb), SpO2 94%.  Intake/Output last 3 Shifts:  I/O last 3 completed shifts:  In: 4492.5 (60 mL/kg) [P.O.:400; I.V.:3488.3 (46.6 mL/kg); IV Piggyback:604.2]  Out: 122 (1.6 mL/kg) [Urine:120 (0 mL/kg/hr); Blood:2]  Weight: 74.8 kg     Relevant Results                              Assessment & Plan  Appendicitis    Restless legs syndrome    Primary hypertension    Patient is a 70-year-old female who presented with acute appendicitis now status post laparoscopic appendectomy.  Patient tolerating a diet pain is controlled on oral medications will follow-up with Dr. Almanza as an outpatient.    Patient will be discussed with Attending Physician Dr. Cami Dietrich PGY4  General Surgery Service     "

## 2025-04-29 NOTE — PROGRESS NOTES
"Laurie Fermin \"Shaina\" is a 70 y.o. female on day 0 of admission presenting with Appendicitis.    Review of Systems   Constitutional:  Negative for chills, diaphoresis, fatigue and fever.   HENT:  Negative for congestion, facial swelling, sneezing and sore throat.    Respiratory:  Negative for cough, choking, chest tightness, shortness of breath and wheezing.    Cardiovascular:  Negative for chest pain, palpitations and leg swelling.   Gastrointestinal:  Positive for abdominal pain and nausea. Negative for constipation, diarrhea and vomiting.   Genitourinary:  Negative for dysuria, hematuria and urgency.   Musculoskeletal:  Negative for back pain, gait problem, joint swelling and neck pain.   Skin:  Negative for rash and wound.   Neurological:  Negative for syncope, weakness and light-headedness.   Psychiatric/Behavioral:  Negative for agitation, confusion and hallucinations. The patient is not nervous/anxious.    All other systems reviewed and are negative.     Subjective   Laurie Fermin \"Shaina\" is a 70 y.o. female with PMHx s/f hypertension presenting with abdominal pain nausea.  Patient presented after developing abdominal pain across the lower abdomen.  The pain was sharp and it been fairly constant since last evening with some associated nausea.  Patient was given IV pain medicine with improvement of her pain and nausea.  In the ED she had CT scan suggestive of early appendicitis with mild leukocytosis.  The patient denies anorexia fevers chills rigors diarrhea constipation.  Patient has not had similar symptoms in the past.  She has not been having any chest pain shortness of breath dizziness lightheadedness orthopnea lower extremity edema.  She recently had a stress echocardiogram that was normal.  Case was discussed with the ED provider patient will be admitted for her abdominal pain and acute pancreatitis.     4/29: Doing well.  Tolerating diet.  Hoping to be discharged to home later today.  No fevers " or chills.  No abdominal distention.  Discussed with general surgery team, will need to reassess her but anticipate discharge to home later today.  Will need to follow-up with general surgery as outpatient.       Objective     Last Recorded Vitals  BP 94/61 (BP Location: Right arm, Patient Position: Lying)   Pulse 86   Temp 36.8 °C (98.2 °F) (Temporal)   Resp 17   Wt 74.8 kg (165 lb)   SpO2 94%   Intake/Output last 3 Shifts:    Intake/Output Summary (Last 24 hours) at 4/29/2025 0953  Last data filed at 4/29/2025 0951  Gross per 24 hour   Intake 6492.5 ml   Output 122 ml   Net 6370.5 ml       Admission Weight  Weight: 74.8 kg (165 lb) (04/28/25 0833)    Daily Weight  04/28/25 : 74.8 kg (165 lb)      Physical Exam  Constitutional:       General: She is not in acute distress.  HENT:      Head: Normocephalic and atraumatic.      Nose: Nose normal. No congestion or rhinorrhea.      Mouth/Throat:      Mouth: Mucous membranes are dry.      Pharynx: Oropharynx is clear.   Eyes:      General: No scleral icterus.     Extraocular Movements: Extraocular movements intact.      Pupils: Pupils are equal, round, and reactive to light.   Cardiovascular:      Rate and Rhythm: Normal rate and regular rhythm.      Heart sounds: Normal heart sounds. No murmur heard.     No friction rub. No gallop.   Pulmonary:      Effort: Pulmonary effort is normal.      Breath sounds: Normal breath sounds. No wheezing, rhonchi or rales.   Chest:      Chest wall: No tenderness.   Abdominal:      General: There is no distension.      Palpations: Abdomen is soft.      Tenderness: There is no abdominal tenderness. There is no guarding or rebound.   Musculoskeletal:         General: No swelling, tenderness or signs of injury. Normal range of motion.      Cervical back: Normal range of motion.   Skin:     General: Skin is warm and dry.      Coloration: Skin is not jaundiced.      Findings: No bruising, erythema or rash.   Neurological:      General: No  focal deficit present.      Mental Status: She is alert and oriented to person, place, and time.          Lab Results  Results for orders placed or performed during the hospital encounter of 04/28/25 (from the past 24 hours)   Urinalysis with Reflex Culture and Microscopic   Result Value Ref Range    Color, Urine Light-Yellow Light-Yellow, Yellow, Dark-Yellow    Appearance, Urine Clear Clear    Specific Gravity, Urine 1.025 1.005 - 1.035    pH, Urine 8.0 5.0, 5.5, 6.0, 6.5, 7.0, 7.5, 8.0    Protein, Urine NEGATIVE NEGATIVE, 10 (TRACE), 20 (TRACE) mg/dL    Glucose, Urine Normal Normal mg/dL    Blood, Urine NEGATIVE NEGATIVE mg/dL    Ketones, Urine 10 (1+) (A) NEGATIVE mg/dL    Bilirubin, Urine NEGATIVE NEGATIVE mg/dL    Urobilinogen, Urine Normal Normal mg/dL    Nitrite, Urine NEGATIVE NEGATIVE    Leukocyte Esterase, Urine NEGATIVE NEGATIVE   Extra Urine Gray Tube   Result Value Ref Range    Extra Tube Hold for add-ons.    CBC   Result Value Ref Range    WBC 26.5 (H) 4.4 - 11.3 x10*3/uL    nRBC 0.0 0.0 - 0.0 /100 WBCs    RBC 3.58 (L) 4.00 - 5.20 x10*6/uL    Hemoglobin 10.8 (L) 12.0 - 16.0 g/dL    Hematocrit 32.8 (L) 36.0 - 46.0 %    MCV 92 80 - 100 fL    MCH 30.2 26.0 - 34.0 pg    MCHC 32.9 32.0 - 36.0 g/dL    RDW 13.4 11.5 - 14.5 %    Platelets 188 150 - 450 x10*3/uL   Basic metabolic panel   Result Value Ref Range    Glucose 114 (H) 74 - 99 mg/dL    Sodium 136 136 - 145 mmol/L    Potassium 4.0 3.5 - 5.3 mmol/L    Chloride 105 98 - 107 mmol/L    Bicarbonate 25 21 - 32 mmol/L    Anion Gap 10 10 - 20 mmol/L    Urea Nitrogen 22 6 - 23 mg/dL    Creatinine 0.99 0.50 - 1.05 mg/dL    eGFR 61 >60 mL/min/1.73m*2    Calcium 8.1 (L) 8.6 - 10.3 mg/dL        Image Results  CT abdomen pelvis w IV contrast  Narrative: Interpreted By:  Baljinder Lawrence,   STUDY:  CT ABDOMEN PELVIS W IV CONTRAST;  4/28/2025 10:20 am      INDICATION:  Signs/Symptoms:lower abdominal pain.      COMPARISON:  None.      ACCESSION NUMBER(S):  [id number]       ORDERING CLINICIAN:  FLOR NAM      TECHNIQUE:  CT of the abdomen and pelvis was performed.  Standard contiguous  axial images were obtained at 3 mm slice thickness through the  abdomen and pelvis. Coronal and sagittal reconstructions at 3 mm  slice thickness were performed.      FINDINGS:  LOWER CHEST:  No consolidation or effusion. Small left fat containing Bochdalek  hernia      ABDOMEN:      LIVER:  Unremarkable      BILE DUCTS:  No significant biliary dilatation.      GALLBLADDER:  No calcified gallstone.      PANCREAS:  Unremarkable      SPLEEN:  Unremarkable.      ADRENAL GLANDS:  Unremarkable.      KIDNEYS AND URETERS:  The bilateral kidneys enhance symmetrically. No hydroureteronephrosis  or nephroureterolithiasis is identified.      PELVIS:      BLADDER:  Unremarkable.      REPRODUCTIVE ORGANS:  Round fluid attenuation lesions associated with the right ovary  measuring 1.4 cm (series 4, image 41), incompletely characterize  likely benign cysts. Uterus is grossly unremarkable.      BOWEL:  No abnormal bowel wall thickening no pathologic distention of large  or small bowel. Thick-walled appendix measuring 1.2 cm in thickness  (series 2, image 75      VESSELS:  No abdominal aortic aneurysm.      PERITONEUM/RETROPERITONEUM/LYMPH NODES:  No ascites or free air, no fluid collection.  No enlarged mesenteric  lymph nodes.      BONES AND ABDOMINAL WALL:  Age-indeterminate T11 vertebral body compression fracture  (approximately 25% height loss) with Schmorl's node not evident on  the most recent comparison chest radiograph 12/27/2024. Multilevel  spondylosis throughout the imaged spine. Lytic lesion within the  right superior acetabulum central soft tissue density (series 2,  image 106) measuring 1.6 cm with      Impression: 1. Mural thickening of the appendix measuring up to 1.2 cm in  diameter without adjacent inflammatory change. The need for close  follow-up which may include repeat CT of the abdomen  and pelvis can  be determined on a clinical basis. This finding was communicated with  the ordering provider by myself at the time of dictation.  2. No bowel obstruction.  3. Age-indeterminate T11 vertebral body compression fracture.  Correlation with point tenderness and consideration of further  evaluation with MRI of the thoracic spine is recommended.  4. Additional chronic and/or ancillary findings detailed above.      MACRO:  None      Signed by: Baljinder Lawrence 4/28/2025 10:52 AM  Dictation workstation:   [id number]       Assessment/Plan     Assessment & Plan  Appendicitis  Patient n.p.o. will be given IV hydration  As needed morphine for pain and Zofran for nausea  Consult to acute care surgery  4/29: Appendectomy done 4/28 per .  Likely discharge later today per general surgery.  Will continue to monitor and discharge if appropriate.  Restless legs syndrome    Primary hypertension  Patient's blood pressure typically well-controlled  Patient with recent stress echocardiogram showing normal EF no evidence of inducible ischemia  4/29: Stable.          Sridhar Garrett MD

## 2025-04-29 NOTE — DISCHARGE INSTRUCTIONS
Follow up with PCP in 1-2 weeks. Call to schedule. Bring all your discharge paperwork and medications when you go to your follow up appointment. Return to ED if your symptoms come back.    Follow-up with general surgery in 2 weeks.

## 2025-05-02 ENCOUNTER — HOSPITAL ENCOUNTER (OUTPATIENT)
Dept: INFUSION THERAPY | Age: 70
Setting detail: INFUSION SERIES
Discharge: HOME | End: 2025-05-02

## 2025-05-09 ENCOUNTER — HOSPITAL ENCOUNTER (OUTPATIENT)
Dept: INFUSION THERAPY | Age: 70
Setting detail: INFUSION SERIES
Discharge: HOME | End: 2025-05-09

## 2025-05-09 NOTE — PROGRESS NOTES
Pt stated she took fosamax 70mg this past Wednesday the 7th. Pt is here for prolia. Pharmacist questioned and stated that pt should not have the injection for about 2 weeks. Call placed to Dr. Sagastume who ordered and the office is closed. Faxed an FYI. Pt d/c and rescheduled.

## 2025-05-21 ENCOUNTER — APPOINTMENT (OUTPATIENT)
Dept: PRIMARY CARE | Facility: CLINIC | Age: 70
End: 2025-05-21
Payer: MEDICARE

## 2025-05-21 VITALS
HEIGHT: 67 IN | OXYGEN SATURATION: 93 % | WEIGHT: 165 LBS | DIASTOLIC BLOOD PRESSURE: 73 MMHG | SYSTOLIC BLOOD PRESSURE: 116 MMHG | BODY MASS INDEX: 25.9 KG/M2 | HEART RATE: 83 BPM

## 2025-05-21 DIAGNOSIS — G25.81 RESTLESS LEGS SYNDROME: ICD-10-CM

## 2025-05-21 DIAGNOSIS — F32.1 DEPRESSION, MAJOR, SINGLE EPISODE, MODERATE (MULTI): ICD-10-CM

## 2025-05-21 DIAGNOSIS — M81.0 OSTEOPOROSIS, UNSPECIFIED OSTEOPOROSIS TYPE, UNSPECIFIED PATHOLOGICAL FRACTURE PRESENCE: ICD-10-CM

## 2025-05-21 DIAGNOSIS — R20.2 PAINFUL PARESTHESIA: ICD-10-CM

## 2025-05-21 DIAGNOSIS — R52 PAINFUL PARESTHESIA: ICD-10-CM

## 2025-05-21 DIAGNOSIS — I10 PRIMARY HYPERTENSION: ICD-10-CM

## 2025-05-21 DIAGNOSIS — Z00.00 HEALTH CARE MAINTENANCE: Primary | ICD-10-CM

## 2025-05-21 PROBLEM — K37 APPENDICITIS: Status: RESOLVED | Noted: 2025-04-28 | Resolved: 2025-05-21

## 2025-05-21 PROCEDURE — 1036F TOBACCO NON-USER: CPT

## 2025-05-21 PROCEDURE — 3008F BODY MASS INDEX DOCD: CPT

## 2025-05-21 PROCEDURE — 99214 OFFICE O/P EST MOD 30 MIN: CPT

## 2025-05-21 PROCEDURE — G2211 COMPLEX E/M VISIT ADD ON: HCPCS

## 2025-05-21 PROCEDURE — 3078F DIAST BP <80 MM HG: CPT

## 2025-05-21 PROCEDURE — 1160F RVW MEDS BY RX/DR IN RCRD: CPT

## 2025-05-21 PROCEDURE — 1159F MED LIST DOCD IN RCRD: CPT

## 2025-05-21 PROCEDURE — 3074F SYST BP LT 130 MM HG: CPT

## 2025-05-21 RX ORDER — AMLODIPINE BESYLATE 10 MG/1
10 TABLET ORAL DAILY
COMMUNITY

## 2025-05-21 ASSESSMENT — ENCOUNTER SYMPTOMS
MUSCULOSKELETAL NEGATIVE: 1
NEUROLOGICAL NEGATIVE: 1
OCCASIONAL FEELINGS OF UNSTEADINESS: 0
DEPRESSION: 0
ALLERGIC/IMMUNOLOGIC NEGATIVE: 1
CARDIOVASCULAR NEGATIVE: 1
PSYCHIATRIC NEGATIVE: 1
CONSTITUTIONAL NEGATIVE: 1
RESPIRATORY NEGATIVE: 1
EYES NEGATIVE: 1
ENDOCRINE NEGATIVE: 1
LOSS OF SENSATION IN FEET: 0
HEMATOLOGIC/LYMPHATIC NEGATIVE: 1
GASTROINTESTINAL NEGATIVE: 1

## 2025-05-21 ASSESSMENT — PATIENT HEALTH QUESTIONNAIRE - PHQ9
2. FEELING DOWN, DEPRESSED OR HOPELESS: NOT AT ALL
SUM OF ALL RESPONSES TO PHQ9 QUESTIONS 1 AND 2: 0
1. LITTLE INTEREST OR PLEASURE IN DOING THINGS: NOT AT ALL

## 2025-05-23 ENCOUNTER — HOSPITAL ENCOUNTER (OUTPATIENT)
Dept: INFUSION THERAPY | Age: 70
Setting detail: INFUSION SERIES
Discharge: HOME | End: 2025-05-23
Payer: MEDICARE

## 2025-05-23 VITALS
HEIGHT: 67 IN | DIASTOLIC BLOOD PRESSURE: 59 MMHG | TEMPERATURE: 97.2 F | RESPIRATION RATE: 16 BRPM | OXYGEN SATURATION: 98 % | HEART RATE: 70 BPM | BODY MASS INDEX: 25.9 KG/M2 | WEIGHT: 165 LBS | SYSTOLIC BLOOD PRESSURE: 120 MMHG

## 2025-05-23 DIAGNOSIS — M81.0 OSTEOPOROSIS, UNSPECIFIED OSTEOPOROSIS TYPE, UNSPECIFIED PATHOLOGICAL FRACTURE PRESENCE: Primary | ICD-10-CM

## 2025-05-23 PROCEDURE — 6360000002 HC RX W HCPCS: Performed by: OBSTETRICS & GYNECOLOGY

## 2025-05-23 PROCEDURE — 96372 THER/PROPH/DIAG INJ SC/IM: CPT

## 2025-05-23 RX ADMIN — DENOSUMAB 60 MG: 60 INJECTION SUBCUTANEOUS at 07:51

## 2025-05-23 ASSESSMENT — PAIN DESCRIPTION - DESCRIPTORS: DESCRIPTORS: ACHING

## 2025-05-23 ASSESSMENT — PAIN DESCRIPTION - LOCATION: LOCATION: BACK

## 2025-05-23 ASSESSMENT — PAIN SCALES - GENERAL: PAINLEVEL_OUTOF10: 1

## 2025-05-23 ASSESSMENT — PAIN DESCRIPTION - ORIENTATION: ORIENTATION: LOWER

## 2025-05-23 ASSESSMENT — PAIN - FUNCTIONAL ASSESSMENT: PAIN_FUNCTIONAL_ASSESSMENT: PREVENTS OR INTERFERES SOME ACTIVE ACTIVITIES AND ADLS

## 2025-05-23 NOTE — PROGRESS NOTES
Tolerated injection well.  Reviewed therapy plan, offered education material and/or discharge material, reviewed medication information and signs and symptoms  and educated on possible side effects, verbalizes good knowledge of current plan patient verbalizes understanding, and has no signs or symptoms to report at this time.   Patient discharged. Patient alert and oriented x3.   No distress noted.   Vital signs stable.   Patient denies any new or worsening pain.  Patient denies any needs.  All questions answered.  Next appointment scheduled.Instructed on lab draw for next injection.

## 2025-08-21 ENCOUNTER — APPOINTMENT (OUTPATIENT)
Dept: PRIMARY CARE | Facility: CLINIC | Age: 70
End: 2025-08-21
Payer: MEDICARE

## 2025-08-21 VITALS
WEIGHT: 158 LBS | OXYGEN SATURATION: 97 % | HEART RATE: 87 BPM | SYSTOLIC BLOOD PRESSURE: 126 MMHG | HEIGHT: 67 IN | DIASTOLIC BLOOD PRESSURE: 75 MMHG | BODY MASS INDEX: 24.8 KG/M2

## 2025-08-21 DIAGNOSIS — Z13.0 SCREENING FOR ENDOCRINE, NUTRITIONAL, METABOLIC AND IMMUNITY DISORDER: ICD-10-CM

## 2025-08-21 DIAGNOSIS — Z00.00 HEALTH CARE MAINTENANCE: ICD-10-CM

## 2025-08-21 DIAGNOSIS — Z13.29 SCREENING FOR ENDOCRINE, NUTRITIONAL, METABOLIC AND IMMUNITY DISORDER: ICD-10-CM

## 2025-08-21 DIAGNOSIS — F32.1 DEPRESSION, MAJOR, SINGLE EPISODE, MODERATE (MULTI): ICD-10-CM

## 2025-08-21 DIAGNOSIS — E53.8 VITAMIN B12 DEFICIENCY: ICD-10-CM

## 2025-08-21 DIAGNOSIS — Z13.228 SCREENING FOR ENDOCRINE, NUTRITIONAL, METABOLIC AND IMMUNITY DISORDER: ICD-10-CM

## 2025-08-21 DIAGNOSIS — E55.9 VITAMIN D DEFICIENCY: Primary | ICD-10-CM

## 2025-08-21 DIAGNOSIS — Z13.21 SCREENING FOR ENDOCRINE, NUTRITIONAL, METABOLIC AND IMMUNITY DISORDER: ICD-10-CM

## 2025-08-21 DIAGNOSIS — I10 PRIMARY HYPERTENSION: ICD-10-CM

## 2025-08-21 DIAGNOSIS — M81.0 OSTEOPOROSIS, UNSPECIFIED OSTEOPOROSIS TYPE, UNSPECIFIED PATHOLOGICAL FRACTURE PRESENCE: ICD-10-CM

## 2025-08-21 PROCEDURE — 99213 OFFICE O/P EST LOW 20 MIN: CPT

## 2025-08-21 PROCEDURE — 1160F RVW MEDS BY RX/DR IN RCRD: CPT

## 2025-08-21 PROCEDURE — G2211 COMPLEX E/M VISIT ADD ON: HCPCS

## 2025-08-21 PROCEDURE — 1159F MED LIST DOCD IN RCRD: CPT

## 2025-08-21 PROCEDURE — 3078F DIAST BP <80 MM HG: CPT

## 2025-08-21 PROCEDURE — 3008F BODY MASS INDEX DOCD: CPT

## 2025-08-21 PROCEDURE — 1036F TOBACCO NON-USER: CPT

## 2025-08-21 PROCEDURE — 3074F SYST BP LT 130 MM HG: CPT

## 2025-08-21 ASSESSMENT — PATIENT HEALTH QUESTIONNAIRE - PHQ9
SUM OF ALL RESPONSES TO PHQ9 QUESTIONS 1 AND 2: 0
1. LITTLE INTEREST OR PLEASURE IN DOING THINGS: NOT AT ALL
2. FEELING DOWN, DEPRESSED OR HOPELESS: NOT AT ALL

## 2025-08-21 ASSESSMENT — ENCOUNTER SYMPTOMS
EYES NEGATIVE: 1
OCCASIONAL FEELINGS OF UNSTEADINESS: 0
PSYCHIATRIC NEGATIVE: 1
LOSS OF SENSATION IN FEET: 0
NEUROLOGICAL NEGATIVE: 1
ENDOCRINE NEGATIVE: 1
MUSCULOSKELETAL NEGATIVE: 1
ALLERGIC/IMMUNOLOGIC NEGATIVE: 1
DEPRESSION: 0
HEMATOLOGIC/LYMPHATIC NEGATIVE: 1
CARDIOVASCULAR NEGATIVE: 1
GASTROINTESTINAL NEGATIVE: 1
CONSTITUTIONAL NEGATIVE: 1
RESPIRATORY NEGATIVE: 1

## 2025-08-26 LAB
25(OH)D3+25(OH)D2 SERPL-MCNC: 39 NG/ML (ref 30–100)
ALBUMIN SERPL-MCNC: 4.2 G/DL (ref 3.6–5.1)
ALP SERPL-CCNC: 90 U/L (ref 37–153)
ALT SERPL-CCNC: 13 U/L (ref 6–29)
ANION GAP SERPL CALCULATED.4IONS-SCNC: 6 MMOL/L (CALC) (ref 7–17)
AST SERPL-CCNC: 18 U/L (ref 10–35)
BASOPHILS # BLD AUTO: 82 CELLS/UL (ref 0–200)
BASOPHILS NFR BLD AUTO: 1.3 %
BILIRUB SERPL-MCNC: 0.5 MG/DL (ref 0.2–1.2)
BUN SERPL-MCNC: 13 MG/DL (ref 7–25)
CALCIUM SERPL-MCNC: 9.8 MG/DL (ref 8.6–10.4)
CHLORIDE SERPL-SCNC: 104 MMOL/L (ref 98–110)
CHOLEST SERPL-MCNC: 188 MG/DL
CHOLEST/HDLC SERPL: 2.6 (CALC)
CO2 SERPL-SCNC: 30 MMOL/L (ref 20–32)
CREAT SERPL-MCNC: 0.93 MG/DL (ref 0.6–1)
EGFRCR SERPLBLD CKD-EPI 2021: 66 ML/MIN/1.73M2
EOSINOPHIL # BLD AUTO: 120 CELLS/UL (ref 15–500)
EOSINOPHIL NFR BLD AUTO: 1.9 %
ERYTHROCYTE [DISTWIDTH] IN BLOOD BY AUTOMATED COUNT: 12.6 % (ref 11–15)
EST. AVERAGE GLUCOSE BLD GHB EST-MCNC: 103 MG/DL
EST. AVERAGE GLUCOSE BLD GHB EST-SCNC: 5.7 MMOL/L
GLUCOSE SERPL-MCNC: 80 MG/DL (ref 65–99)
HBA1C MFR BLD: 5.2 %
HCT VFR BLD AUTO: 41.7 % (ref 35–45)
HDLC SERPL-MCNC: 73 MG/DL
HGB BLD-MCNC: 13.6 G/DL (ref 11.7–15.5)
LDLC SERPL CALC-MCNC: 100 MG/DL (CALC)
LYMPHOCYTES # BLD AUTO: 2098 CELLS/UL (ref 850–3900)
LYMPHOCYTES NFR BLD AUTO: 33.3 %
MCH RBC QN AUTO: 30.2 PG (ref 27–33)
MCHC RBC AUTO-ENTMCNC: 32.6 G/DL (ref 32–36)
MCV RBC AUTO: 92.7 FL (ref 80–100)
MONOCYTES # BLD AUTO: 491 CELLS/UL (ref 200–950)
MONOCYTES NFR BLD AUTO: 7.8 %
NEUTROPHILS # BLD AUTO: 3509 CELLS/UL (ref 1500–7800)
NEUTROPHILS NFR BLD AUTO: 55.7 %
NONHDLC SERPL-MCNC: 115 MG/DL (CALC)
PLATELET # BLD AUTO: 279 THOUSAND/UL (ref 140–400)
PMV BLD REES-ECKER: 10.4 FL (ref 7.5–12.5)
POTASSIUM SERPL-SCNC: 4.2 MMOL/L (ref 3.5–5.3)
PROT SERPL-MCNC: 6.6 G/DL (ref 6.1–8.1)
RBC # BLD AUTO: 4.5 MILLION/UL (ref 3.8–5.1)
SODIUM SERPL-SCNC: 140 MMOL/L (ref 135–146)
T4 FREE SERPL-MCNC: 0.9 NG/DL (ref 0.8–1.8)
TRIGL SERPL-MCNC: 67 MG/DL
TSH SERPL-ACNC: 5.86 MIU/L (ref 0.4–4.5)
VIT B12 SERPL-MCNC: 718 PG/ML (ref 200–1100)
WBC # BLD AUTO: 6.3 THOUSAND/UL (ref 3.8–10.8)

## 2025-08-28 ENCOUNTER — RESULTS FOLLOW-UP (OUTPATIENT)
Dept: PRIMARY CARE | Facility: CLINIC | Age: 70
End: 2025-08-28
Payer: MEDICARE

## 2025-08-28 DIAGNOSIS — E03.8 SUBCLINICAL HYPOTHYROIDISM: Primary | ICD-10-CM

## 2025-12-03 ENCOUNTER — APPOINTMENT (OUTPATIENT)
Dept: PRIMARY CARE | Facility: CLINIC | Age: 70
End: 2025-12-03
Payer: MEDICARE

## (undated) DEVICE — DEFENDO AIR WATER SUCTION AND BIOPSY VALVE KIT FOR  OLYMPUS: Brand: DEFENDO AIR/WATER/SUCTION AND BIOPSY VALVE

## (undated) DEVICE — PREP TRAY, SKIN, DRY, W/GLOVES

## (undated) DEVICE — TROCAR, KII OPTICAL BLADELESS 5MM Z THREAD 100MM LNGTH

## (undated) DEVICE — CANNULA NSL ORAL AD FOR CAPNOFLEX CO2 O2 AIRLFE

## (undated) DEVICE — CONTAINER SPEC 60ML PH 7NEUTRAL BUFF FRMLN RDY TO USE

## (undated) DEVICE — CUTTER, LINEAR FLEX ARTICNG 45MM, NO LOAD

## (undated) DEVICE — FORCEPS BX PED L160CM JAW DIA1.8MM WRK CHN 2MM GASTRO YEL

## (undated) DEVICE — ENDO BABCOCKS, 5MM

## (undated) DEVICE — Device

## (undated) DEVICE — SOLUTION, IRRIGATION, SODIUM CHLORIDE 0.9%, 1000 ML, POUR BOTTLE

## (undated) DEVICE — PREP, SCRUB, SKIN, FOAM, HIBICLENS, 4 OZ

## (undated) DEVICE — SUTURE, VICRYL PLUS, 0, 27IN, UR-6, VIOLET, BRAIDED

## (undated) DEVICE — BITEBLOCK 54FR W/ DENT RIM BLOX

## (undated) DEVICE — CONNECTOR IRRIGATION AUXILIARY H2O JET W/ PRT MTL THRD HYDR

## (undated) DEVICE — SUTURE, VICRYL, 4-0, 18 IN, UNDYED BR PS-2

## (undated) DEVICE — CAESAR GRASPING FORCEPS: Brand: CAESAR

## (undated) DEVICE — SINGLE-USE BIOPSY FORCEPS: Brand: RADIAL JAW 4

## (undated) DEVICE — RELOAD, LINEAR, 45MM, BLUE, REG TISSUE

## (undated) DEVICE — SLEEVE, KII, Z-THREAD, 5X100CM

## (undated) DEVICE — GLOVE, PROTEXIS PI CLASSIC, SZ-7.5, PF, LF

## (undated) DEVICE — FORCEPS BX L240CM JAW DIA2.4MM ORNG L CAP W/ NDL DISP RAD

## (undated) DEVICE — FORCEPS BX L240CM JAW DIA2.4MM WRK CHN 2.8MM ORNG L CAP W/

## (undated) DEVICE — RELOAD, LINEAR, 45MM, WHITE

## (undated) DEVICE — COVER HANDLE LIGHT, STERIS, BLUE, STERILE

## (undated) DEVICE — CONNECTOR TBNG AUX H2O JET DISP FOR OLY 160/180 SER

## (undated) DEVICE — PAD, GROUNDING, ELECTROSURGICAL, W/9 FT CABLE, POLYHESIVE II, ADULT, LF

## (undated) DEVICE — CABLE, ELECTROSURGICAL, MONOPOLAR, LAPAROSCOPIC, 10 FT, LF

## (undated) DEVICE — FORCEPS BX L160CM JAW DIA2.4MM YEL L CAP W/ NDL DISP RAD

## (undated) DEVICE — GAUZE,SPONGE,4"X4",8PLY,STRL,LF,10/TRAY: Brand: MEDLINE

## (undated) DEVICE — DRESSING, GAUZE, SPONGE, 8 PLY, CURITY, 2 X 2 IN, STERILE

## (undated) DEVICE — GAUZE,SPONGE,POST-OP,4X3,STRL,LF: Brand: MEDLINE